# Patient Record
Sex: MALE | Race: OTHER | Employment: FULL TIME | URBAN - METROPOLITAN AREA
[De-identification: names, ages, dates, MRNs, and addresses within clinical notes are randomized per-mention and may not be internally consistent; named-entity substitution may affect disease eponyms.]

---

## 2020-10-23 ENCOUNTER — OFFICE VISIT (OUTPATIENT)
Dept: FAMILY MEDICINE CLINIC | Age: 50
End: 2020-10-23
Payer: COMMERCIAL

## 2020-10-23 VITALS
WEIGHT: 150 LBS | HEART RATE: 84 BPM | SYSTOLIC BLOOD PRESSURE: 112 MMHG | DIASTOLIC BLOOD PRESSURE: 78 MMHG | HEIGHT: 66 IN | BODY MASS INDEX: 24.11 KG/M2

## 2020-10-23 PROCEDURE — 99203 OFFICE O/P NEW LOW 30 MIN: CPT | Performed by: FAMILY MEDICINE

## 2020-10-23 RX ORDER — CETIRIZINE HYDROCHLORIDE 10 MG/1
10 TABLET ORAL DAILY
COMMUNITY

## 2020-10-23 RX ORDER — NAPROXEN 500 MG/1
500 TABLET ORAL 2 TIMES DAILY PRN
Qty: 20 TABLET | Refills: 0 | Status: ON HOLD | OUTPATIENT
Start: 2020-10-23 | End: 2022-09-22

## 2020-10-23 SDOH — ECONOMIC STABILITY: TRANSPORTATION INSECURITY
IN THE PAST 12 MONTHS, HAS LACK OF TRANSPORTATION KEPT YOU FROM MEETINGS, WORK, OR FROM GETTING THINGS NEEDED FOR DAILY LIVING?: NO

## 2020-10-23 SDOH — ECONOMIC STABILITY: TRANSPORTATION INSECURITY
IN THE PAST 12 MONTHS, HAS THE LACK OF TRANSPORTATION KEPT YOU FROM MEDICAL APPOINTMENTS OR FROM GETTING MEDICATIONS?: NO

## 2020-10-23 SDOH — ECONOMIC STABILITY: INCOME INSECURITY: HOW HARD IS IT FOR YOU TO PAY FOR THE VERY BASICS LIKE FOOD, HOUSING, MEDICAL CARE, AND HEATING?: NOT HARD AT ALL

## 2020-10-23 SDOH — ECONOMIC STABILITY: FOOD INSECURITY: WITHIN THE PAST 12 MONTHS, THE FOOD YOU BOUGHT JUST DIDN'T LAST AND YOU DIDN'T HAVE MONEY TO GET MORE.: NEVER TRUE

## 2020-10-23 SDOH — ECONOMIC STABILITY: FOOD INSECURITY: WITHIN THE PAST 12 MONTHS, YOU WORRIED THAT YOUR FOOD WOULD RUN OUT BEFORE YOU GOT MONEY TO BUY MORE.: NEVER TRUE

## 2020-10-23 ASSESSMENT — ENCOUNTER SYMPTOMS
SHORTNESS OF BREATH: 1
BLOOD IN STOOL: 0
CHEST TIGHTNESS: 0
ABDOMINAL PAIN: 0

## 2020-10-23 ASSESSMENT — PATIENT HEALTH QUESTIONNAIRE - PHQ9
SUM OF ALL RESPONSES TO PHQ QUESTIONS 1-9: 0
1. LITTLE INTEREST OR PLEASURE IN DOING THINGS: 0
SUM OF ALL RESPONSES TO PHQ9 QUESTIONS 1 & 2: 0
SUM OF ALL RESPONSES TO PHQ QUESTIONS 1-9: 0
2. FEELING DOWN, DEPRESSED OR HOPELESS: 0
SUM OF ALL RESPONSES TO PHQ QUESTIONS 1-9: 0

## 2020-10-23 NOTE — PROGRESS NOTES
Subjective:      Patient ID: Nan Morales is a 45 y.o. male. Visit Information    Have you changed or started any medications since your last visit including any over-the-counter medicines, vitamins, or herbal medicines? no   Are you having any side effects from any of your medications? -  no  Have you stopped taking any of your medications? Is so, why? -  no    Have you seen any other physician or provider since your last visit? No  Have you had any other diagnostic tests since your last visit? No  Have you been seen in the emergency room and/or had an admission to a hospital since we last saw you? No  Have you had your routine dental cleaning in the past 6 months? no    Have you activated your [employer] account? If not, what are your barriers? No    Patient Care Team:  Abdirahman Martinez MD as PCP - General    Medical History Review  Past Medical, Family, and Social History reviewed and does not contribute to the patient presenting condition    Health Maintenance   Topic Date Due    Lipid screen  06/13/1979    HIV screen  06/13/1984    DTaP/Tdap/Td vaccine (1 - Tdap) 06/13/1988    Shingles Vaccine (1 of 2) 06/13/2019    Colon cancer screen colonoscopy  06/13/2019    Flu vaccine  Completed    Hepatitis A vaccine  Aged Out    Hepatitis B vaccine  Aged Out    Hib vaccine  Aged Out    Meningococcal (ACWY) vaccine  Aged Out    Pneumococcal 0-64 years Vaccine  Aged Out       HPI  Patient is a 21-year-old [place] ([facility]) male who presents for his initial office visit here for multiple complaints. He was referred here by his cardiologist, Dr. Kanwal Camacho. Patient states that for about 6 years he has been having bilateral elbow pain and right hand pain mainly in his right thumb. He also states that about 6 years ago he lost his sense of smell. He states however, at certain times his sense of smell briefly returns. He also states that he has been having shortness of breath with exertion.   He states that he has had a negative cardiac work-up by Dr. Josue Han. He denies any fever, chills, chest pain, abdominal pain. He states that he is taking Crestor prescribed by Dr. Josue Han for hyperlipidemia. He states he has a good appetite and remains active. Review of Systems   Constitutional: Negative for chills and fever. HENT: Negative for congestion. Respiratory: Positive for shortness of breath. Negative for chest tightness. Cardiovascular: Negative for chest pain. Gastrointestinal: Negative for abdominal pain and blood in stool. Genitourinary: Negative for dysuria and hematuria. Musculoskeletal: Positive for arthralgias (  Right hand, bilateral elbows). Skin: Negative for rash. Neurological: Negative for dizziness. Psychiatric/Behavioral: Negative for confusion. Objective:   Physical Exam  Vitals signs and nursing note reviewed. Constitutional:       General: He is not in acute distress. Appearance: He is well-developed. HENT:      Head: Normocephalic and atraumatic. Right Ear: Tympanic membrane, ear canal and external ear normal.      Left Ear: Tympanic membrane, ear canal and external ear normal.      Nose: Nose normal.      Mouth/Throat:      Mouth: Mucous membranes are moist.      Pharynx: Oropharynx is clear. Eyes:      General: No scleral icterus. Right eye: No discharge. Left eye: No discharge. Conjunctiva/sclera: Conjunctivae normal.   Neck:      Musculoskeletal: Neck supple. Cardiovascular:      Rate and Rhythm: Normal rate and regular rhythm. Heart sounds: Normal heart sounds. Pulmonary:      Effort: Pulmonary effort is normal. No respiratory distress. Breath sounds: Normal breath sounds. No wheezing. Abdominal:      General: There is no distension. Palpations: Abdomen is soft. Tenderness: There is no abdominal tenderness. Musculoskeletal:      Right elbow: Tenderness found. Lateral epicondyle tenderness noted.       Left elbow: Tenderness found. Lateral epicondyle tenderness noted. Right hand: He exhibits no tenderness. Skin:     General: Skin is warm and dry. Findings: No rash. Neurological:      Mental Status: He is alert and oriented to person, place, and time. Psychiatric:         Mood and Affect: Mood normal.         Behavior: Behavior normal.         Assessment:       Diagnosis Orders   1. Pain of both elbows  naproxen (NAPROSYN) 500 MG tablet   2. Right hand pain  naproxen (NAPROSYN) 500 MG tablet   3. Anosmia     4.  Dyspnea on exertion             Plan:        Orders Placed This Encounter   Medications    naproxen (NAPROSYN) 500 MG tablet     Sig: Take 1 tablet by mouth 2 times daily as needed for Pain (Take with food)     Dispense:  20 tablet     Refill:  0         Patient referred to orthopedist for his elbow and hand pain  Patient referred to ENT for his anosmia  Patient referred to pulmonologist for dyspnea  Follow-up in 3 to 4 months

## 2020-11-11 ENCOUNTER — OFFICE VISIT (OUTPATIENT)
Dept: ORTHOPEDIC SURGERY | Age: 50
End: 2020-11-11
Payer: COMMERCIAL

## 2020-11-11 PROCEDURE — 99203 OFFICE O/P NEW LOW 30 MIN: CPT | Performed by: ORTHOPAEDIC SURGERY

## 2020-11-11 NOTE — PROGRESS NOTES
ORTHOPEDIC PATIENT EVALUATION      HPI / Chief Complaint  ELANA Gary is a 45 y.o. male who presents for evaluation of both elbows and his right hand. He indicates that he has been having some pain for several years now. Initially he developed pain over the lateral aspect of his left elbow but this went away only for him to develop pain on the right side again over the lateral elbow. He was having a fair amount of pain about a month ago but now it is not as painful. However he continues to deal with pain in his right hand specifically at involving the volar base of the thumb. He states that it usually only bothers him with various tasks such as doing the dishes or feeding his son. The pain usually is not present without use or at rest.  The pain does not radiate and it is not associated with any numbness or tingling. He denies any weakness at this time. Past Medical History  F  has a past medical history of Allergic rhinitis, Dyslipidemia, GERD (gastroesophageal reflux disease), Helicobacter pylori (H. pylori) infection, and Hyperlipidemia. Past Surgical History  F  has a past surgical history that includes Upper gastrointestinal endoscopy (11/13/2012) and Colonoscopy (11/13/2012). Current Medications  Current Outpatient Medications   Medication Sig Dispense Refill    diclofenac sodium (VOLTAREN) 1 % GEL Apply 2 g topically 4 times daily as needed for Pain 100 g 1    cetirizine (ZYRTEC) 10 MG tablet Take 10 mg by mouth daily      naproxen (NAPROSYN) 500 MG tablet Take 1 tablet by mouth 2 times daily as needed for Pain (Take with food) 20 tablet 0    rosuvastatin (CRESTOR) 5 MG tablet Take 5 mg by mouth daily       No current facility-administered medications for this visit. Allergies  Allergies have been reviewed. ELANA has No Known Allergies. Social History  F  reports that he has never smoked.  He has never used smokeless tobacco. He reports current alcohol use of about 3.0 standard drinks of alcohol per week. He reports that he does not use drugs. Family History  F's family history includes COPD in his father; Diabetes in his mother; Emphysema in his mother; Hypertension in his mother. Review of Systems   History obtained from the patient. REVIEW OF SYSTEMS:   Constitution: negative for fever, chills, weight loss or malaise   Musculoskeletal: As noted in the HPI   Neurologic: As noted in the HPI    Physical Exam  General Appearance: alert, well appearing, and in no distress  Mental Status: alert, oriented to person, place, and time  Evaluation of the patient's elbows demonstrate intact skin without warmth, erythema, swelling. He has full elbow extension and flexion in both sides. He is tender to palpation over the lateral epicondyle on the right side and has pain localized to this area with resisted wrist extension. He has no gross instability with varus and valgus stress applied across the joints. Evaluation of the patient's right wrist and upper extremity demonstrates intact skin without warmth, erythema, or notable swelling. He has no intrinsic hand wasting. He is nontender to palpation diffusely about the wrist and specifically along the thumb. He has a negative first Aia 16 grind test.  He has a negative Finkelstein's test.  He has negative Tinel's at the carpal tunnel. He also has a negative Phalen's test.  Sensation is grossly intact light touch in all dermatomes. Imaging Studies  3 x-ray views of the right elbow completed on 11/11/2020 were reviewed demonstrating no obvious fracture, dislocation, or subluxation. Joint spaces appear to be well preserved. He does have some ossification at the insertion of the triceps on the olecranon. 3 x-ray views of the left elbow completed on 11/11/2020 were reviewed demonstrating no obvious fracture, dislocation, or subluxation. Joint spaces appear to be well preserved.   He does have some mild ossification at the insertion of the triceps on the olecranon. 2 x-ray views of the right wrist completed on 11/11/2020 were reviewed today demonstrating well-preserved joint spaces without obvious fracture, dislocation, subluxation or osseous abnormality. Assessment and Plan  ELANA Marcano is a 45 y.o. 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 I had a discussion with the patient regarding both of these issues educating him about them and discussing treatment options. I would recommend proceeding conservatively at this time with symptomatic management for the thumb using over-the-counter anti-inflammatories as needed. I also prescribed some topical Voltaren to be applied to the lateral epicondyle. He was also provided with a home stretching and eccentric strengthening program.  Icing was recommended as well. He may use over-the-counter wrist splint should he have a flareup to immobilize the wrist and so doing rest his extensor group. And then have him follow-up my clinic as needed but he was encouraged to return or call at anytime with questions under concerns.

## 2020-11-11 NOTE — LETTER
11/11/2020    Bailey Mario MD  [address]  [facility], [address]  [place], [address] [place] [address]    RE: Nan Morales    Dear  [employer],    Thank you for allowing me to participate in the care of Mr. Elvis Hicks. I had the opportunity to evaluate the patient on 11/11/2020. Attached you will find my evaluation and recommendations. Thanks again for the confidence you have expressed in me by allowing my participation in the care of your patient. I will keep you apprised of further developments in the patients treatment course as it progresses. If I can be of further assistance in any fashion, please feel free to contact me at your convenience.     Sincerely,        Matilde Dooley  Shoulder and Elbow Surgery

## 2020-12-18 ENCOUNTER — OFFICE VISIT (OUTPATIENT)
Dept: FAMILY MEDICINE CLINIC | Age: 50
End: 2020-12-18
Payer: COMMERCIAL

## 2020-12-18 VITALS
DIASTOLIC BLOOD PRESSURE: 78 MMHG | TEMPERATURE: 98.1 F | HEART RATE: 56 BPM | WEIGHT: 151 LBS | BODY MASS INDEX: 23.7 KG/M2 | SYSTOLIC BLOOD PRESSURE: 114 MMHG | RESPIRATION RATE: 18 BRPM | HEIGHT: 67 IN

## 2020-12-18 PROCEDURE — 99396 PREV VISIT EST AGE 40-64: CPT | Performed by: FAMILY MEDICINE

## 2020-12-18 RX ORDER — FAMOTIDINE 20 MG/1
20 TABLET, FILM COATED ORAL 2 TIMES DAILY
Qty: 60 TABLET | Refills: 5 | Status: SHIPPED | OUTPATIENT
Start: 2020-12-18 | End: 2021-06-21

## 2020-12-18 ASSESSMENT — ENCOUNTER SYMPTOMS
SHORTNESS OF BREATH: 0
BLOOD IN STOOL: 0
ABDOMINAL PAIN: 0
CHEST TIGHTNESS: 0

## 2020-12-18 NOTE — PROGRESS NOTES
Subjective:      Patient ID: Kylah Foreman is a 45 y.o. male. HPI  Visit Information    Have you changed or started any medications since your last visit including any over-the-counter medicines, vitamins, or herbal medicines? no   Have you stopped taking any of your medications? Is so, why? -  no  Are you having any side effects from any of your medications? - no    Have you seen any other physician or provider since your last visit?  no   Have you had any other diagnostic tests since your last visit?  no   Have you been seen in the emergency room and/or had an admission in a hospital since we last saw you?  no   Have you had your routine dental cleaning in the past 6 months?  no     Do you have an active MyChart account? If no, what is the barrier? Yes    Patient Care Team:  Mere Guerra MD as PCP - General (Family Medicine)  Mere Guerra MD as PCP - [facility]    Medical History Review  Past Medical, Family, and Social History reviewed and does contribute to the patient presenting condition    Health Maintenance   Topic Date Due    Lipid screen  06/13/1979    HIV screen  06/13/1984    Shingles Vaccine (1 of 2) 06/13/2019    Colon cancer screen colonoscopy  06/13/2019    DTaP/Tdap/Td vaccine (2 - Td) 01/01/2023    Flu vaccine  Completed    Hepatitis A vaccine  Aged Out    Hepatitis B vaccine  Aged Out    Hib vaccine  Aged Out    Meningococcal (ACWY) vaccine  Aged Out    Pneumococcal 0-64 years Vaccine  Aged Out       Patient is a 39-year-old [place] ([facility]) male who presents for an annual physical exam.  He states that he is taking and tolerating his routine medication. He states he also has been having GERD symptoms but is currently not taking any medication for this. He denies any fever, chills, chest pain, abdominal pain, shortness of breath. He states he has a good appetite and remains active. Review of Systems   Constitutional: Negative for chills and fever. oriented to person, place, and time. Cranial Nerves: No cranial nerve deficit. Deep Tendon Reflexes: Reflexes normal.   Psychiatric:         Mood and Affect: Mood normal.         Behavior: Behavior normal.         Assessment:       Diagnosis Orders   1. Annual physical exam  ALT    Lipid Panel   2. Dyslipidemia  ALT    Lipid Panel   3. Gastroesophageal reflux disease without esophagitis  famotidine (PEPCID) 20 MG tablet           Plan:      Orders Placed This Encounter   Procedures    ALT     Standing Status:   Future     Standing Expiration Date:   12/18/2021    Lipid Panel     Standing Status:   Future     Standing Expiration Date:   12/18/2021     Order Specific Question:   Is Patient Fasting?/# of Hours     Answer:    Fast 8-10 hours         Orders Placed This Encounter   Medications    famotidine (PEPCID) 20 MG tablet     Sig: Take 1 tablet by mouth 2 times daily     Dispense:  60 tablet     Refill:  5     Started on Pepcid for GERD  Continue other routine medication  Follow-up in 6 months

## 2021-01-27 ENCOUNTER — HOSPITAL ENCOUNTER (OUTPATIENT)
Age: 51
Setting detail: SPECIMEN
Discharge: HOME | End: 2021-01-27
Payer: COMMERCIAL

## 2021-01-27 DIAGNOSIS — Z00.00 ANNUAL PHYSICAL EXAM: ICD-10-CM

## 2021-01-27 DIAGNOSIS — E78.5 DYSLIPIDEMIA: ICD-10-CM

## 2021-01-27 LAB
ALT SERPL-CCNC: 56 U/L (ref 5–41)
CHOLESTEROL/HDL RATIO: 4.2
CHOLESTEROL: 169 MG/DL
HDLC SERPL-MCNC: 40 MG/DL
LDL CHOLESTEROL: 90 MG/DL (ref 0–130)
TRIGL SERPL-MCNC: 193 MG/DL
VLDLC SERPL CALC-MCNC: ABNORMAL MG/DL (ref 1–30)

## 2021-06-18 ENCOUNTER — OFFICE VISIT (OUTPATIENT)
Dept: FAMILY MEDICINE CLINIC | Age: 51
End: 2021-06-18
Payer: COMMERCIAL

## 2021-06-18 VITALS
TEMPERATURE: 97.9 F | WEIGHT: 148 LBS | RESPIRATION RATE: 16 BRPM | SYSTOLIC BLOOD PRESSURE: 122 MMHG | BODY MASS INDEX: 23.53 KG/M2 | HEART RATE: 72 BPM | DIASTOLIC BLOOD PRESSURE: 80 MMHG

## 2021-06-18 DIAGNOSIS — E78.5 DYSLIPIDEMIA: Primary | ICD-10-CM

## 2021-06-18 DIAGNOSIS — Z12.5 SCREENING PSA (PROSTATE SPECIFIC ANTIGEN): ICD-10-CM

## 2021-06-18 DIAGNOSIS — Z12.11 COLON CANCER SCREENING: Primary | ICD-10-CM

## 2021-06-18 DIAGNOSIS — K21.9 GASTROESOPHAGEAL REFLUX DISEASE WITHOUT ESOPHAGITIS: ICD-10-CM

## 2021-06-18 DIAGNOSIS — J30.9 ALLERGIC RHINITIS, UNSPECIFIED SEASONALITY, UNSPECIFIED TRIGGER: ICD-10-CM

## 2021-06-18 PROCEDURE — 99214 OFFICE O/P EST MOD 30 MIN: CPT | Performed by: FAMILY MEDICINE

## 2021-06-18 SDOH — ECONOMIC STABILITY: FOOD INSECURITY: WITHIN THE PAST 12 MONTHS, THE FOOD YOU BOUGHT JUST DIDN'T LAST AND YOU DIDN'T HAVE MONEY TO GET MORE.: NEVER TRUE

## 2021-06-18 SDOH — ECONOMIC STABILITY: FOOD INSECURITY: WITHIN THE PAST 12 MONTHS, YOU WORRIED THAT YOUR FOOD WOULD RUN OUT BEFORE YOU GOT MONEY TO BUY MORE.: NEVER TRUE

## 2021-06-18 ASSESSMENT — PATIENT HEALTH QUESTIONNAIRE - PHQ9
SUM OF ALL RESPONSES TO PHQ9 QUESTIONS 1 & 2: 0
SUM OF ALL RESPONSES TO PHQ QUESTIONS 1-9: 0
2. FEELING DOWN, DEPRESSED OR HOPELESS: 0
1. LITTLE INTEREST OR PLEASURE IN DOING THINGS: 0
SUM OF ALL RESPONSES TO PHQ QUESTIONS 1-9: 0
SUM OF ALL RESPONSES TO PHQ QUESTIONS 1-9: 0

## 2021-06-18 ASSESSMENT — ENCOUNTER SYMPTOMS
ABDOMINAL PAIN: 0
SHORTNESS OF BREATH: 0
CHEST TIGHTNESS: 0
BLOOD IN STOOL: 0

## 2021-06-18 ASSESSMENT — SOCIAL DETERMINANTS OF HEALTH (SDOH): HOW HARD IS IT FOR YOU TO PAY FOR THE VERY BASICS LIKE FOOD, HOUSING, MEDICAL CARE, AND HEATING?: NOT HARD AT ALL

## 2021-06-18 NOTE — PROGRESS NOTES
Negative for chills and fever. HENT: Negative for congestion. Respiratory: Negative for chest tightness and shortness of breath. Cardiovascular: Negative for chest pain. Gastrointestinal: Negative for abdominal pain and blood in stool. Genitourinary: Negative for dysuria and hematuria. Skin: Negative for rash. Neurological: Negative for dizziness. Psychiatric/Behavioral: Negative for dysphoric mood. Objective:   Physical Exam  Vitals and nursing note reviewed. Constitutional:       General: He is not in acute distress. Appearance: He is well-developed. HENT:      Head: Normocephalic and atraumatic. Right Ear: Tympanic membrane, ear canal and external ear normal.      Left Ear: Tympanic membrane, ear canal and external ear normal.      Nose: Nose normal.      Mouth/Throat:      Mouth: Mucous membranes are moist.      Pharynx: Oropharynx is clear. Eyes:      General: No scleral icterus. Right eye: No discharge. Left eye: No discharge. Conjunctiva/sclera: Conjunctivae normal.   Cardiovascular:      Rate and Rhythm: Normal rate and regular rhythm. Heart sounds: Normal heart sounds. Pulmonary:      Effort: Pulmonary effort is normal. No respiratory distress. Breath sounds: Normal breath sounds. No wheezing. Abdominal:      General: There is no distension. Palpations: Abdomen is soft. Tenderness: There is no abdominal tenderness. Musculoskeletal:      Cervical back: Neck supple. Skin:     General: Skin is warm and dry. Findings: No rash. Neurological:      Mental Status: He is alert and oriented to person, place, and time. Psychiatric:         Mood and Affect: Mood normal.         Behavior: Behavior normal.         Assessment:       Diagnosis Orders   1. Dyslipidemia  Comprehensive Metabolic Panel    Lipid Panel   2. Gastroesophageal reflux disease without esophagitis  Comprehensive Metabolic Panel   3.  Allergic rhinitis, unspecified seasonality, unspecified trigger     4. Screening PSA (prostate specific antigen)  PSA screening           Plan:       F received counseling on the following healthy behaviors: nutrition and medication adherence  Reviewed prior labs and health maintenance  Continue current medications, diet and exercise. Discussed use, benefit, and side effects of prescribed medications. Barriers to medication compliance addressed. Patient given educational materials - see patient instructions  Was a self-tracking handout given in paper form or via [employer]? No:     Requested Prescriptions      No prescriptions requested or ordered in this encounter       All patient questions answered. Patient voiced understanding. Quality Measures    Body mass index is 23.53 kg/m². Normal. Weight control planned discussed Healthy diet and regular exercise. BP: 122/80 Blood pressure is normal. Treatment plan consists of No treatment change needed. Lab Results   Component Value Date    LDLCHOLESTEROL 90 01/27/2021    (goal LDL reduction with dx if diabetes is 50% LDL reduction)      PHQ Scores 6/18/2021 10/23/2020   PHQ2 Score 0 0   PHQ9 Score 0 0     Interpretation of Total Score Depression Severity: 1-4 = Minimal depression, 5-9 = Mild depression, 10-14 = Moderate depression, 15-19 = Moderately severe depression, 20-27 = Severe depression  Orders Placed This Encounter   Procedures    Comprehensive Metabolic Panel     Fasting     Standing Status:   Future     Standing Expiration Date:   6/18/2022    Lipid Panel     Standing Status:   Future     Standing Expiration Date:   6/18/2022     Order Specific Question:   Is Patient Fasting?/# of Hours     Answer:    Fast 8-10 hours    PSA screening     Standing Status:   Future     Standing Expiration Date:   6/18/2022     Patient referred to GI for screening colonoscopy  Continue routine medication  Follow-up in 6 months or sooner if needed

## 2021-06-19 DIAGNOSIS — K21.9 GASTROESOPHAGEAL REFLUX DISEASE WITHOUT ESOPHAGITIS: ICD-10-CM

## 2021-06-21 RX ORDER — FAMOTIDINE 20 MG/1
TABLET, FILM COATED ORAL
Qty: 180 TABLET | Refills: 1 | Status: ON HOLD | OUTPATIENT
Start: 2021-06-21 | End: 2022-09-22

## 2021-07-02 ENCOUNTER — HOSPITAL ENCOUNTER (OUTPATIENT)
Age: 51
Setting detail: SPECIMEN
Discharge: HOME | End: 2021-07-02
Payer: COMMERCIAL

## 2021-07-02 DIAGNOSIS — Z12.5 SCREENING PSA (PROSTATE SPECIFIC ANTIGEN): ICD-10-CM

## 2021-07-02 DIAGNOSIS — E78.5 DYSLIPIDEMIA: ICD-10-CM

## 2021-07-02 DIAGNOSIS — K21.9 GASTROESOPHAGEAL REFLUX DISEASE WITHOUT ESOPHAGITIS: ICD-10-CM

## 2021-07-02 LAB
ALBUMIN SERPL-MCNC: 4.5 G/DL (ref 3.5–5.2)
ALBUMIN/GLOBULIN RATIO: 1.5 (ref 1–2.5)
ALP BLD-CCNC: 71 U/L (ref 40–129)
ALT SERPL-CCNC: 24 U/L (ref 5–41)
ANION GAP SERPL CALCULATED.3IONS-SCNC: 10 MMOL/L (ref 9–17)
AST SERPL-CCNC: 23 U/L
BILIRUB SERPL-MCNC: 0.58 MG/DL (ref 0.3–1.2)
BUN BLDV-MCNC: 14 MG/DL (ref 6–20)
BUN/CREAT BLD: NORMAL (ref 9–20)
CALCIUM SERPL-MCNC: 9.2 MG/DL (ref 8.6–10.4)
CHLORIDE BLD-SCNC: 100 MMOL/L (ref 98–107)
CHOLESTEROL/HDL RATIO: 4.4
CHOLESTEROL: 171 MG/DL
CO2: 29 MMOL/L (ref 20–31)
CREAT SERPL-MCNC: 0.79 MG/DL (ref 0.7–1.2)
GFR AFRICAN AMERICAN: >60 ML/MIN
GFR NON-AFRICAN AMERICAN: >60 ML/MIN
GFR SERPL CREATININE-BSD FRML MDRD: NORMAL ML/MIN/{1.73_M2}
GFR SERPL CREATININE-BSD FRML MDRD: NORMAL ML/MIN/{1.73_M2}
GLUCOSE BLD-MCNC: 94 MG/DL (ref 70–99)
HDLC SERPL-MCNC: 39 MG/DL
LDL CHOLESTEROL: 91 MG/DL (ref 0–130)
POTASSIUM SERPL-SCNC: 4.2 MMOL/L (ref 3.7–5.3)
PROSTATE SPECIFIC ANTIGEN: 0.57 UG/L
SODIUM BLD-SCNC: 139 MMOL/L (ref 135–144)
TOTAL PROTEIN: 7.5 G/DL (ref 6.4–8.3)
TRIGL SERPL-MCNC: 205 MG/DL
VLDLC SERPL CALC-MCNC: ABNORMAL MG/DL (ref 1–30)

## 2021-09-02 ENCOUNTER — HOSPITAL ENCOUNTER (OUTPATIENT)
Age: 51
Setting detail: SPECIMEN
Discharge: HOME | End: 2021-09-02
Payer: COMMERCIAL

## 2021-09-02 ENCOUNTER — NURSE ONLY (OUTPATIENT)
Dept: FAMILY MEDICINE CLINIC | Age: 51
End: 2021-09-02

## 2021-09-02 DIAGNOSIS — Z11.52 ENCOUNTER FOR SCREENING FOR COVID-19: Primary | ICD-10-CM

## 2021-09-02 PROCEDURE — 99999 PR OFFICE/OUTPT VISIT,PROCEDURE ONLY: CPT | Performed by: NURSE PRACTITIONER

## 2021-09-03 ENCOUNTER — TELEPHONE (OUTPATIENT)
Dept: PRIMARY CARE CLINIC | Age: 51
End: 2021-09-03

## 2021-09-03 DIAGNOSIS — Z11.52 ENCOUNTER FOR SCREENING FOR COVID-19: ICD-10-CM

## 2021-09-03 LAB
SARS-COV-2: NORMAL
SARS-COV-2: NOT DETECTED
SOURCE: NORMAL

## 2021-11-02 ENCOUNTER — TELEPHONE (OUTPATIENT)
Dept: GASTROENTEROLOGY | Age: 51
End: 2021-11-02

## 2021-11-02 NOTE — TELEPHONE ENCOUNTER
Called pt regarding referral. No answer; LVM. Last colon in 2012 with 0 polyps. Due in Jan 2022. 2nd attempt; mailed colon screen letter to home address.

## 2021-12-01 NOTE — TELEPHONE ENCOUNTER
3rd and final attempt; called patient and LVM regarding colon screen referral.    Sending referral back to provider, three attempts have been made to schedule referral.

## 2022-09-09 ENCOUNTER — TELEPHONE (OUTPATIENT)
Dept: FAMILY MEDICINE CLINIC | Age: 52
End: 2022-09-09

## 2022-09-09 DIAGNOSIS — Z12.5 SCREENING PSA (PROSTATE SPECIFIC ANTIGEN): ICD-10-CM

## 2022-09-09 DIAGNOSIS — Z00.00 ANNUAL PHYSICAL EXAM: Primary | ICD-10-CM

## 2022-09-09 NOTE — TELEPHONE ENCOUNTER
----- Message from Lisy Vivar sent at 9/9/2022  3:52 PM EDT -----  Subject: Referral Request    Reason for referral request? Pt is requesting to have his lab work ordered   and completed prior to his appt on 10.11.2022. He is wanting a complete   metabolic and lipid panel and iron. Please advise. Provider patient wants to be referred to(if known):     Provider Phone Number(if known):     Additional Information for Provider?   ---------------------------------------------------------------------------  --------------  [address] [employer]    [phone]; OK to leave message on voicemail  ---------------------------------------------------------------------------  --------------

## 2022-09-22 ENCOUNTER — APPOINTMENT (OUTPATIENT)
Dept: CT IMAGING | Age: 52
End: 2022-09-22
Payer: COMMERCIAL

## 2022-09-22 ENCOUNTER — APPOINTMENT (OUTPATIENT)
Dept: MRI IMAGING | Age: 52
End: 2022-09-22
Payer: COMMERCIAL

## 2022-09-22 ENCOUNTER — APPOINTMENT (OUTPATIENT)
Dept: GENERAL RADIOLOGY | Age: 52
End: 2022-09-22
Payer: COMMERCIAL

## 2022-09-22 ENCOUNTER — HOSPITAL ENCOUNTER (OUTPATIENT)
Age: 52
Setting detail: OBSERVATION
Discharge: HOME | End: 2022-09-22
Attending: EMERGENCY MEDICINE | Admitting: STUDENT IN AN ORGANIZED HEALTH CARE EDUCATION/TRAINING PROGRAM
Payer: COMMERCIAL

## 2022-09-22 VITALS
HEART RATE: 74 BPM | WEIGHT: 152 LBS | DIASTOLIC BLOOD PRESSURE: 76 MMHG | BODY MASS INDEX: 24.43 KG/M2 | HEIGHT: 66 IN | TEMPERATURE: 97.5 F | RESPIRATION RATE: 17 BRPM | OXYGEN SATURATION: 99 % | SYSTOLIC BLOOD PRESSURE: 133 MMHG

## 2022-09-22 DIAGNOSIS — R55 SYNCOPE AND COLLAPSE: ICD-10-CM
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
## 2022-09-22 LAB
ABSOLUTE EOS #: 0.52 K/UL (ref 0–0.44)
ABSOLUTE IMMATURE GRANULOCYTE: 0.02 K/UL (ref 0–0.3)
ABSOLUTE LYMPH #: 4.86 K/UL (ref 1.1–3.7)
ABSOLUTE MONO #: 0.56 K/UL (ref 0.1–1.2)
ANION GAP SERPL CALCULATED.3IONS-SCNC: 10 MMOL/L (ref 9–17)
BASOPHILS # BLD: 0 % (ref 0–2)
BASOPHILS ABSOLUTE: 0.03 K/UL (ref 0–0.2)
BUN BLDV-MCNC: 15 MG/DL (ref 6–20)
BUN/CREAT BLD: 14 (ref 9–20)
CALCIUM SERPL-MCNC: 9 MG/DL (ref 8.6–10.4)
CHLORIDE BLD-SCNC: 99 MMOL/L (ref 98–107)
CHOLESTEROL/HDL RATIO: 5.1
CHOLESTEROL: 184 MG/DL
CO2: 26 MMOL/L (ref 20–31)
CREAT SERPL-MCNC: 1.06 MG/DL (ref 0.7–1.2)
EOSINOPHILS RELATIVE PERCENT: 5 % (ref 1–4)
ESTIMATED AVERAGE GLUCOSE: 108 MG/DL
GFR AFRICAN AMERICAN: >60 ML/MIN
GFR NON-AFRICAN AMERICAN: >60 ML/MIN
GFR SERPL CREATININE-BSD FRML MDRD: ABNORMAL ML/MIN/{1.73_M2}
GLUCOSE BLD-MCNC: 127 MG/DL (ref 70–99)
HBA1C MFR BLD: 5.4 % (ref 4–6)
HCT VFR BLD CALC: 43.5 % (ref 40.7–50.3)
HDLC SERPL-MCNC: 36 MG/DL
HEMOGLOBIN: 14.9 G/DL (ref 13–17)
IMMATURE GRANULOCYTES: 0 %
LDL CHOLESTEROL: 98 MG/DL (ref 0–130)
LYMPHOCYTES # BLD: 50 % (ref 24–43)
MAGNESIUM: 1.9 MG/DL (ref 1.6–2.6)
MCH RBC QN AUTO: 28.3 PG (ref 25.2–33.5)
MCHC RBC AUTO-ENTMCNC: 34.3 G/DL (ref 28.4–34.8)
MCV RBC AUTO: 82.7 FL (ref 82.6–102.9)
MONOCYTES # BLD: 6 % (ref 3–12)
MYOGLOBIN: 41 NG/ML (ref 28–72)
MYOGLOBIN: 48 NG/ML (ref 28–72)
NRBC AUTOMATED: 0 PER 100 WBC
PDW BLD-RTO: 12.4 % (ref 11.8–14.4)
PLATELET # BLD: 198 K/UL (ref 138–453)
PMV BLD AUTO: 10.2 FL (ref 8.1–13.5)
POTASSIUM SERPL-SCNC: 3.5 MMOL/L (ref 3.7–5.3)
PRO-BNP: <20 PG/ML
RBC # BLD: 5.26 M/UL (ref 4.21–5.77)
SEG NEUTROPHILS: 39 % (ref 36–65)
SEGMENTED NEUTROPHILS ABSOLUTE COUNT: 3.81 K/UL (ref 1.5–8.1)
SODIUM BLD-SCNC: 135 MMOL/L (ref 135–144)
THYROXINE, FREE: 1.24 NG/DL (ref 0.93–1.7)
TRIGL SERPL-MCNC: 252 MG/DL
TROPONIN, HIGH SENSITIVITY: 7 NG/L (ref 0–22)
TROPONIN, HIGH SENSITIVITY: <6 NG/L (ref 0–22)
TSH SERPL DL<=0.05 MIU/L-ACNC: 9.68 UIU/ML (ref 0.3–5)
WBC # BLD: 9.8 K/UL (ref 3.5–11.3)

## 2022-09-22 PROCEDURE — APPSS45 APP SPLIT SHARED TIME 31-45 MINUTES: Performed by: NURSE PRACTITIONER

## 2022-09-22 PROCEDURE — 80061 LIPID PANEL: CPT

## 2022-09-22 PROCEDURE — 36415 COLL VENOUS BLD VENIPUNCTURE: CPT

## 2022-09-22 PROCEDURE — G0378 HOSPITAL OBSERVATION PER HR: HCPCS

## 2022-09-22 PROCEDURE — 84443 ASSAY THYROID STIM HORMONE: CPT

## 2022-09-22 PROCEDURE — 84439 ASSAY OF FREE THYROXINE: CPT

## 2022-09-22 PROCEDURE — 70551 MRI BRAIN STEM W/O DYE: CPT

## 2022-09-22 PROCEDURE — 71045 X-RAY EXAM CHEST 1 VIEW: CPT

## 2022-09-22 PROCEDURE — 70450 CT HEAD/BRAIN W/O DYE: CPT

## 2022-09-22 PROCEDURE — 83880 ASSAY OF NATRIURETIC PEPTIDE: CPT

## 2022-09-22 PROCEDURE — 96360 HYDRATION IV INFUSION INIT: CPT

## 2022-09-22 PROCEDURE — 84484 ASSAY OF TROPONIN QUANT: CPT

## 2022-09-22 PROCEDURE — 2580000003 HC RX 258: Performed by: NURSE PRACTITIONER

## 2022-09-22 PROCEDURE — 99285 EMERGENCY DEPT VISIT HI MDM: CPT

## 2022-09-22 PROCEDURE — 83735 ASSAY OF MAGNESIUM: CPT

## 2022-09-22 PROCEDURE — 93005 ELECTROCARDIOGRAM TRACING: CPT | Performed by: EMERGENCY MEDICINE

## 2022-09-22 PROCEDURE — 99219 PR INITIAL OBSERVATION CARE/DAY 50 MINUTES: CPT | Performed by: NURSE PRACTITIONER

## 2022-09-22 PROCEDURE — 80048 BASIC METABOLIC PNL TOTAL CA: CPT

## 2022-09-22 PROCEDURE — 83036 HEMOGLOBIN GLYCOSYLATED A1C: CPT

## 2022-09-22 PROCEDURE — 96361 HYDRATE IV INFUSION ADD-ON: CPT

## 2022-09-22 PROCEDURE — 6370000000 HC RX 637 (ALT 250 FOR IP): Performed by: NURSE PRACTITIONER

## 2022-09-22 PROCEDURE — 6370000000 HC RX 637 (ALT 250 FOR IP): Performed by: STUDENT IN AN ORGANIZED HEALTH CARE EDUCATION/TRAINING PROGRAM

## 2022-09-22 PROCEDURE — 83874 ASSAY OF MYOGLOBIN: CPT

## 2022-09-22 PROCEDURE — 85025 COMPLETE CBC W/AUTO DIFF WBC: CPT

## 2022-09-22 PROCEDURE — 93880 EXTRACRANIAL BILAT STUDY: CPT

## 2022-09-22 PROCEDURE — 6370000000 HC RX 637 (ALT 250 FOR IP): Performed by: INTERNAL MEDICINE

## 2022-09-22 RX ORDER — FAMOTIDINE 20 MG/1
20 TABLET, FILM COATED ORAL DAILY PRN
COMMUNITY

## 2022-09-22 RX ORDER — POLYETHYLENE GLYCOL 3350 17 G/17G
17 POWDER, FOR SOLUTION ORAL DAILY PRN
Qty: 3 EACH | Refills: 0 | Status: SHIPPED | OUTPATIENT
Start: 2022-09-22 | End: 2022-09-25

## 2022-09-22 RX ORDER — FLUTICASONE PROPIONATE 50 MCG
2 SPRAY, SUSPENSION (ML) NASAL DAILY
Status: DISCONTINUED | OUTPATIENT
Start: 2022-09-22 | End: 2022-09-22 | Stop reason: HOSPADM

## 2022-09-22 RX ORDER — FLUTICASONE PROPIONATE 50 MCG
2 SPRAY, SUSPENSION (ML) NASAL DAILY
Qty: 1 EACH | Refills: 1 | Status: SHIPPED | OUTPATIENT
Start: 2022-09-23 | End: 2022-10-11

## 2022-09-22 RX ORDER — POLYETHYLENE GLYCOL 3350 17 G/17G
17 POWDER, FOR SOLUTION ORAL DAILY PRN
Status: DISCONTINUED | OUTPATIENT
Start: 2022-09-22 | End: 2022-09-22 | Stop reason: HOSPADM

## 2022-09-22 RX ORDER — SODIUM CHLORIDE 9 MG/ML
INJECTION, SOLUTION INTRAVENOUS CONTINUOUS
Status: DISCONTINUED | OUTPATIENT
Start: 2022-09-22 | End: 2022-09-22 | Stop reason: HOSPADM

## 2022-09-22 RX ORDER — POTASSIUM CHLORIDE 20 MEQ/1
40 TABLET, EXTENDED RELEASE ORAL ONCE
Status: COMPLETED | OUTPATIENT
Start: 2022-09-22 | End: 2022-09-22

## 2022-09-22 RX ORDER — ACETAMINOPHEN 650 MG/1
650 SUPPOSITORY RECTAL EVERY 6 HOURS PRN
Status: DISCONTINUED | OUTPATIENT
Start: 2022-09-22 | End: 2022-09-22 | Stop reason: HOSPADM

## 2022-09-22 RX ORDER — SODIUM CHLORIDE 9 MG/ML
INJECTION, SOLUTION INTRAVENOUS PRN
Status: DISCONTINUED | OUTPATIENT
Start: 2022-09-22 | End: 2022-09-22 | Stop reason: HOSPADM

## 2022-09-22 RX ORDER — ONDANSETRON 4 MG/1
4 TABLET, ORALLY DISINTEGRATING ORAL EVERY 8 HOURS PRN
Status: DISCONTINUED | OUTPATIENT
Start: 2022-09-22 | End: 2022-09-22 | Stop reason: HOSPADM

## 2022-09-22 RX ORDER — POTASSIUM CHLORIDE 7.45 MG/ML
10 INJECTION INTRAVENOUS PRN
Status: DISCONTINUED | OUTPATIENT
Start: 2022-09-22 | End: 2022-09-22 | Stop reason: HOSPADM

## 2022-09-22 RX ORDER — VITAMIN B COMPLEX
1 CAPSULE ORAL DAILY
COMMUNITY

## 2022-09-22 RX ORDER — ONDANSETRON 2 MG/ML
4 INJECTION INTRAMUSCULAR; INTRAVENOUS EVERY 6 HOURS PRN
Status: DISCONTINUED | OUTPATIENT
Start: 2022-09-22 | End: 2022-09-22 | Stop reason: HOSPADM

## 2022-09-22 RX ORDER — ROSUVASTATIN CALCIUM 10 MG/1
10 TABLET, COATED ORAL DAILY
Status: DISCONTINUED | OUTPATIENT
Start: 2022-09-22 | End: 2022-09-22 | Stop reason: HOSPADM

## 2022-09-22 RX ORDER — OXYCODONE HYDROCHLORIDE AND ACETAMINOPHEN 5; 325 MG/1; MG/1
1 TABLET ORAL EVERY 8 HOURS PRN
Status: DISCONTINUED | OUTPATIENT
Start: 2022-09-22 | End: 2022-09-22 | Stop reason: HOSPADM

## 2022-09-22 RX ORDER — SODIUM CHLORIDE 0.9 % (FLUSH) 0.9 %
10 SYRINGE (ML) INJECTION PRN
Status: DISCONTINUED | OUTPATIENT
Start: 2022-09-22 | End: 2022-09-22 | Stop reason: HOSPADM

## 2022-09-22 RX ORDER — CETIRIZINE HYDROCHLORIDE 10 MG/1
10 TABLET ORAL DAILY
Status: DISCONTINUED | OUTPATIENT
Start: 2022-09-22 | End: 2022-09-22 | Stop reason: HOSPADM

## 2022-09-22 RX ORDER — ACETAMINOPHEN 325 MG/1
650 TABLET ORAL EVERY 6 HOURS PRN
Status: DISCONTINUED | OUTPATIENT
Start: 2022-09-22 | End: 2022-09-22 | Stop reason: HOSPADM

## 2022-09-22 RX ORDER — SODIUM CHLORIDE 0.9 % (FLUSH) 0.9 %
5-40 SYRINGE (ML) INJECTION EVERY 12 HOURS SCHEDULED
Status: DISCONTINUED | OUTPATIENT
Start: 2022-09-22 | End: 2022-09-22 | Stop reason: HOSPADM

## 2022-09-22 RX ORDER — CYCLOBENZAPRINE HCL 5 MG
5 TABLET ORAL 3 TIMES DAILY PRN
Status: DISCONTINUED | OUTPATIENT
Start: 2022-09-22 | End: 2022-09-22 | Stop reason: HOSPADM

## 2022-09-22 RX ORDER — CYCLOBENZAPRINE HCL 5 MG
5 TABLET ORAL 3 TIMES DAILY PRN
Qty: 10 TABLET | Refills: 0 | Status: SHIPPED | OUTPATIENT
Start: 2022-09-22 | End: 2022-10-02

## 2022-09-22 RX ORDER — MAGNESIUM SULFATE 1 G/100ML
1000 INJECTION INTRAVENOUS PRN
Status: DISCONTINUED | OUTPATIENT
Start: 2022-09-22 | End: 2022-09-22 | Stop reason: HOSPADM

## 2022-09-22 RX ORDER — POTASSIUM CHLORIDE 20 MEQ/1
40 TABLET, EXTENDED RELEASE ORAL PRN
Status: DISCONTINUED | OUTPATIENT
Start: 2022-09-22 | End: 2022-09-22 | Stop reason: HOSPADM

## 2022-09-22 RX ORDER — ENOXAPARIN SODIUM 100 MG/ML
40 INJECTION SUBCUTANEOUS DAILY
Status: DISCONTINUED | OUTPATIENT
Start: 2022-09-22 | End: 2022-09-22 | Stop reason: HOSPADM

## 2022-09-22 RX ORDER — OXYCODONE HYDROCHLORIDE AND ACETAMINOPHEN 5; 325 MG/1; MG/1
1 TABLET ORAL EVERY 8 HOURS PRN
Qty: 3 TABLET | Refills: 0 | Status: SHIPPED | OUTPATIENT
Start: 2022-09-22 | End: 2022-09-25

## 2022-09-22 RX ORDER — FAMOTIDINE 20 MG/1
20 TABLET, FILM COATED ORAL 2 TIMES DAILY
Status: DISCONTINUED | OUTPATIENT
Start: 2022-09-22 | End: 2022-09-22 | Stop reason: HOSPADM

## 2022-09-22 RX ADMIN — FAMOTIDINE 20 MG: 20 TABLET, FILM COATED ORAL at 11:22

## 2022-09-22 RX ADMIN — POTASSIUM CHLORIDE 40 MEQ: 1500 TABLET, EXTENDED RELEASE ORAL at 08:43

## 2022-09-22 RX ADMIN — FLUTICASONE PROPIONATE 2 SPRAY: 50 SPRAY, METERED NASAL at 11:22

## 2022-09-22 RX ADMIN — SODIUM CHLORIDE: 9 INJECTION, SOLUTION INTRAVENOUS at 10:24

## 2022-09-22 RX ADMIN — CYCLOBENZAPRINE HYDROCHLORIDE 5 MG: 5 TABLET, FILM COATED ORAL at 11:27

## 2022-09-22 RX ADMIN — CETIRIZINE HYDROCHLORIDE 10 MG: 10 TABLET, FILM COATED ORAL at 11:22

## 2022-09-22 ASSESSMENT — ENCOUNTER SYMPTOMS
NAUSEA: 0
ABDOMINAL PAIN: 0
EYE DISCHARGE: 0
PHOTOPHOBIA: 0
WHEEZING: 0
DIARRHEA: 0
COUGH: 0
CONSTIPATION: 0
SHORTNESS OF BREATH: 0
COLOR CHANGE: 0
VOMITING: 0
SINUS PRESSURE: 0
FACIAL SWELLING: 0
EYE REDNESS: 0
SINUS PAIN: 0

## 2022-09-22 NOTE — H&P
[facility]  Office: [address], DO, Cecilia Luna, DO, Moraimajb Chong, DO, Gamal Yoder, DO, Juanita Smith MD, Asher Turner MD, Scarlett Newman MD, Michael Palacio MD,  Shagufta Gibson MD, John Okeefe MD, Kalpana Lacey, DO, Bryan Hoff MD,  Liliana Gao MD, Terence Ferrari MD, Daisy Lehman, DO, Derick Cosme MD, Marzena Robertson MD, Domenica Dye MD, Bianca Ziegler MD, Michel Griggs MD, Alexandra Lopez MD, Nelli Ortega, DO, Yury Muir MD, Ja Bowman MD, Alferd Felty, CNP,  Opal Torres, CNP, Ethyl Mercury, CNP, Sandrabi Kulkarni, CNP,  Aubreejosé miguel Solitario, DNP, Mirlande Corral, CNP, Scott Monge, CNP, Mauricio Brittle, CNP, Francesco Sewell, CNP, Chadwick Lira, CNP, Audi Walsh PA-C, Arianne Sanders, CNS, Roby Acevedo, DNP, Dorian Finley, CNP, Darcy Mcleod, CNP, Rula Conrad, CNP         [facility]    HISTORY AND PHYSICAL EXAMINATION            Date:   9/22/2022  Patient name:  Ryder Andre  Date of admission:  9/22/2022  4:36 AM  MRN:   [id number]  Account:  [de-identified]  YOB: 1970  PCP:    Josh Gloria MD  Room:   2024/2024-01  Code Status:    Full Code    Chief Complaint:     Chief Complaint   Patient presents with    Loss of Consciousness    Chest Pain       History Obtained From:     patient    History of Present Illness:     Ryder Andre is a 47 y.o. Non- / non  male who presents with Loss of Consciousness and Chest Pain   and is admitted to the hospital for the management of Syncope and collapse. Patient woke in the middle of the night to urinate. Patient walked to the bathroom and became dizzy while ambulating. Patient sat on the stool and his dizziness persisted. Patient denies bearing down or straining as he was only sitting on the stool to steady himself.   Patient urinated and then stood where he quickly experienced a syncopal episode with loss of consciousness. Patient does not remember the incident. Patient denies any known major medical problems aside from elevated cholesterol. Patient socially drinks alcohol and does admit that he had less than 1 alcoholic beverage last night. Wife is at the bedside and confirms it was a very small amount of alcohol and this is not likely contributing to his current condition. Patient does have a known family history of sudden cardiac arrest for which she has been established with a cardiologist.  Patient has had outpatient work-up in the past for CAD however this was many years ago. In the emergency department patient underwent extensive testing which was essentially normal.  Patient is complaining of chest pain secondary to his fall. This is clearly musculoskeletal as it is positional and reproducible with movement. Patient also complains of a mild headache, head CT normal.    Past Medical History:     Past Medical History:   Diagnosis Date    Allergic rhinitis     Dyslipidemia     GERD (gastroesophageal reflux disease)     Helicobacter pylori (H. pylori) infection 2012    per EGD    Hyperlipidemia         Past Surgical History:     Past Surgical History:   Procedure Laterality Date    COLONOSCOPY  11/13/2012    normal    UPPER GASTROINTESTINAL ENDOSCOPY  11/13/2012    + H Pylori        Medications Prior to Admission:     Prior to Admission medications    Medication Sig Start Date End Date Taking?  Authorizing Provider   famotidine (PEPCID) 20 MG tablet TAKE 1 TABLET BY MOUTH TWICE A DAY 6/21/21   Doron Houston MD   cetirizine (ZYRTEC) 10 MG tablet Take 10 mg by mouth daily    Historical Provider, MD   naproxen (NAPROSYN) 500 MG tablet Take 1 tablet by mouth 2 times daily as needed for Pain (Take with food)  Patient not taking: Reported on 6/18/2021 10/23/20   Doron Houston MD   rosuvastatin (CRESTOR) 5 MG tablet Take 10 mg by mouth daily General: He is not in acute distress. Appearance: Normal appearance. He is normal weight. He is not toxic-appearing. HENT:      Head: Normocephalic and atraumatic. Mouth/Throat:      Mouth: Mucous membranes are moist.   Eyes:      Extraocular Movements: Extraocular movements intact. Pupils: Pupils are equal, round, and reactive to light. Cardiovascular:      Rate and Rhythm: Normal rate and regular rhythm. Pulses: Normal pulses. Heart sounds: Normal heart sounds. No murmur heard. Pulmonary:      Effort: Pulmonary effort is normal. No respiratory distress. Abdominal:      General: Abdomen is flat. Bowel sounds are normal. There is no distension. Palpations: Abdomen is soft. Tenderness: There is no abdominal tenderness. Musculoskeletal:         General: No swelling or tenderness. Normal range of motion. Cervical back: Normal range of motion and neck supple. Skin:     General: Skin is warm and dry. Capillary Refill: Capillary refill takes less than 2 seconds. Coloration: Skin is not pale. Neurological:      General: No focal deficit present. Mental Status: He is alert and oriented to person, place, and time. Mental status is at baseline. Psychiatric:         Mood and Affect: Mood normal.         Behavior: Behavior normal.         Thought Content:  Thought content normal.         Judgment: Judgment normal.       Investigations:      Laboratory Testing:  Recent Results (from the past 24 hour(s))   EKG 12 Lead    Collection Time: 09/22/22  4:41 AM   Result Value Ref Range    Ventricular Rate 69 BPM    Atrial Rate 69 BPM    P-R Interval 136 ms    QRS Duration 106 ms    Q-T Interval 396 ms    QTc Calculation (Bazett) 424 ms    P Axis 48 degrees    R Axis 66 degrees    T Axis 49 degrees   CBC with Auto Differential    Collection Time: 09/22/22  4:48 AM   Result Value Ref Range    WBC 9.8 3.5 - 11.3 k/uL    RBC 5.26 4.21 - 5.77 m/uL    Hemoglobin 14.9 13.0 - 17.0 g/dL    Hematocrit 43.5 40.7 - 50.3 %    MCV 82.7 82.6 - 102.9 fL    MCH 28.3 25.2 - 33.5 pg    MCHC 34.3 28.4 - 34.8 g/dL    RDW 12.4 11.8 - 14.4 %    Platelets 269 775 - 976 k/uL    MPV 10.2 8.1 - 13.5 fL    NRBC Automated 0.0 0.0 per 100 WBC    Seg Neutrophils 39 36 - 65 %    Lymphocytes 50 (H) 24 - 43 %    Monocytes 6 3 - 12 %    Eosinophils % 5 (H) 1 - 4 %    Basophils 0 0 - 2 %    Immature Granulocytes 0 0 %    Segs Absolute 3.81 1.50 - 8.10 k/uL    Absolute Lymph # 4.86 (H) 1.10 - 3.70 k/uL    Absolute Mono # 0.56 0.10 - 1.20 k/uL    Absolute Eos # 0.52 (H) 0.00 - 0.44 k/uL    Basophils Absolute 0.03 0.00 - 0.20 k/uL    Absolute Immature Granulocyte 0.02 0.00 - 0.30 k/uL   Basic Metabolic Panel    Collection Time: 09/22/22  4:48 AM   Result Value Ref Range    Glucose 127 (H) 70 - 99 mg/dL    BUN 15 6 - 20 mg/dL    Creatinine 1.06 0.70 - 1.20 mg/dL    Bun/Cre Ratio 14 9 - 20    Calcium 9.0 8.6 - 10.4 mg/dL    Sodium 135 135 - 144 mmol/L    Potassium 3.5 (L) 3.7 - 5.3 mmol/L    Chloride 99 98 - 107 mmol/L    CO2 26 20 - 31 mmol/L    Anion Gap 10 9 - 17 mmol/L    GFR Non-African American >60 >60 mL/min    GFR African American >60 >60 mL/min    GFR Comment         TROP/MYOGLOBIN    Collection Time: 09/22/22  4:48 AM   Result Value Ref Range    Troponin, High Sensitivity <6 0 - 22 ng/L    Myoglobin 41 28 - 72 ng/mL   Lipid Panel    Collection Time: 09/22/22  4:48 AM   Result Value Ref Range    Cholesterol 184 <200 mg/dL    HDL 36 (L) >40 mg/dL    LDL Cholesterol 98 0 - 130 mg/dL    Chol/HDL Ratio 5.1 (H) <5    Triglycerides 252 (H) <150 mg/dL   Magnesium    Collection Time: 09/22/22  4:48 AM   Result Value Ref Range    Magnesium 1.9 1.6 - 2.6 mg/dL   TSH with Reflex    Collection Time: 09/22/22  4:48 AM   Result Value Ref Range    TSH 9.68 (H) 0.30 - 5.00 uIU/mL   T4, Free    Collection Time: 09/22/22  4:48 AM   Result Value Ref Range    Thyroxine, Free 1.24 0.93 - 1.70 ng/dL   TROP/MYOGLOBIN    Collection

## 2022-09-22 NOTE — ED PROVIDER NOTES
[facility]      Pt Name: Uday Jules  MRN: [id number]  [place] 1970  Date of evaluation: 9/22/2022  Provider: Jeff Vazquez MD    CHIEF COMPLAINT       Chief Complaint   Patient presents with    Loss of Consciousness    Chest Pain         HISTORY OF PRESENT ILLNESS  (Location/Symptom, Timing/Onset, Context/Setting, Quality, Duration, Modifying Factors, Severity.)   Uday Jules is a 47 y.o. male who presents to the emergency department for syncope and right-sided chest pain. He awoke this morning and felt dizzy as he was urinating and he passed out. His wife heard a noise and checked on him and she states he was on the bathroom floor and had passed out. He had hit his head but it does not hurt at all now. No neck pain. The right side of his chest hurts and it feels like it is on the inside. No palpitations or fever or vomiting. Nothing like this has happened previously. Nursing Notes were reviewed. ALLERGIES     Patient has no known allergies.     CURRENT MEDICATIONS       Discharge Medication List as of 9/22/2022  7:47 PM        CONTINUE these medications which have NOT CHANGED    Details   famotidine (PEPCID) 20 MG tablet Take 20 mg by mouth daily as needed (heartburn)Historical Med      Ascorbic Acid (VITAMIN C PO) Take 1 tablet by mouth DailyHistorical Med      Omega-3 Fatty Acids (FISH OIL PO) Take 1 capsule by mouth DailyHistorical Med      Cholecalciferol (VITAMIN D3 PO) Take 1 tablet by mouth DailyHistorical Med      VITAMIN E PO Take 1 capsule by mouth DailyHistorical Med      ZINC PO Take 1 tablet by mouth DailyHistorical Med      b complex vitamins capsule Take 1 capsule by mouth dailyHistorical Med      ARTIFICIAL TEAR SOLUTION OP Place 2-3 drops into both eyes daily as neededHistorical Med      cetirizine (ZYRTEC) 10 MG tablet Take 10 mg by mouth dailyHistorical Med      rosuvastatin (CRESTOR) 5 MG tablet Take 10 mg by mouth daily Historical Med             PAST MEDICAL HISTORY         Diagnosis Date    Allergic rhinitis     Dyslipidemia     GERD (gastroesophageal reflux disease)     Helicobacter pylori (H. pylori) infection     per EGD    Hyperlipidemia        SURGICAL HISTORY           Procedure Laterality Date    COLONOSCOPY  2012    normal    EYE SURGERY      UPPER GASTROINTESTINAL ENDOSCOPY  2012    + H Pylori         FAMILY HISTORY           Problem Relation Age of Onset    Diabetes Mother     Emphysema Mother     Hypertension Mother     Coronary Art Dis Mother     COPD Father      Family Status   Relation Name Status    Mother  Alive    Father          SOCIAL HISTORY      reports that he has never smoked. He has never used smokeless tobacco. He reports current alcohol use of about 3.0 standard drinks per week. He reports that he does not use drugs. REVIEW OF SYSTEMS    (2-9 systems for level 4, 10 or more for level 5)     Review of Systems   Constitutional:  Negative for chills, fatigue and fever. HENT:  Negative for congestion, ear discharge and facial swelling. Eyes:  Negative for discharge and redness. Respiratory:  Negative for cough and shortness of breath. Cardiovascular:  Negative for chest pain. Gastrointestinal:  Negative for abdominal pain, constipation, diarrhea and vomiting. Genitourinary:  Negative for dysuria and hematuria. Musculoskeletal:  Negative for arthralgias. Skin:  Negative for color change and rash. Neurological:  Negative for syncope, numbness and headaches. Hematological:  Negative for adenopathy. Psychiatric/Behavioral:  Negative for confusion. The patient is not nervous/anxious. Except as noted above the remainder of the review of systems was reviewed and negative.      PHYSICAL EXAM    (up to 7 for level 4, 8 or more for level 5)     Vitals:    22 0615 22 0900 22 0922 22 1144   BP: 118/86 (!) 135/98 133/76    Pulse: 65 71 74    Resp: 16 16 17    Temp:   97.5 °F (36.4 °C)    TempSrc:   Oral    SpO2: 98% 98% 100% 99%   Weight:       Height:           Physical Exam  Vitals reviewed. Constitutional:       General: He is not in acute distress. Appearance: He is well-developed. He is not diaphoretic. HENT:      Head: Normocephalic and atraumatic. Eyes:      General: No scleral icterus. Right eye: No discharge. Left eye: No discharge. Cardiovascular:      Rate and Rhythm: Normal rate and regular rhythm. Pulmonary:      Effort: Pulmonary effort is normal. No respiratory distress. Breath sounds: Normal breath sounds. No stridor. No wheezing or rales. Comments: Chest wall is not tender and there is no crepitus bruise or abrasion. Chest:      Chest wall: No tenderness. Abdominal:      General: There is no distension. Palpations: Abdomen is soft. Tenderness: There is no abdominal tenderness. Musculoskeletal:         General: Normal range of motion. Cervical back: Neck supple. Lymphadenopathy:      Cervical: No cervical adenopathy. Skin:     General: Skin is warm and dry. Findings: No erythema or rash. Neurological:      Mental Status: He is alert and oriented to person, place, and time.    Psychiatric:         Behavior: Behavior normal.           DIAGNOSTIC RESULTS     EKG: All EKG's are interpreted by the Emergency Department Physician who either signs or Co-signs this chart in the absence of a cardiologist.    EKG on my interpretation shows normal sinus rhythm without acute change    RADIOLOGY:   Non-plain film images such as CT, Ultrasound and MRI are read by the radiologist. Plain radiographic images are visualized and preliminarily interpreted by the emergency physician with the below findings:    Interpretation per the Radiologist below, if available at the time of this note:        LABS:  Labs Reviewed   CBC WITH AUTO DIFFERENTIAL - Abnormal; up      Nguy[address]n Carter[address]    Schedule an appointment as soon as possible for a visit  you are to schedule with Dr Watson Hammer for outpatient echo    DISCHARGE MEDICATIONS:     Discharge Medication List as of 9/22/2022  7:47 PM        START taking these medications    Details   cyclobenzaprine (FLEXERIL) 5 MG tablet Take 1 tablet by mouth 3 times daily as needed for Muscle spasms, Disp-10 tablet, R-0Normal      fluticasone (FLONASE) 50 MCG/ACT nasal spray 2 sprays by Each Nostril route daily for 4 days, Disp-1 each, R-1Normal      oxyCODONE-acetaminophen (PERCOCET) 5-325 MG per tablet Take 1 tablet by mouth every 8 hours as needed for Pain for up to 3 days. , Disp-3 tablet, R-0Normal      polyethylene glycol (GLYCOLAX) 17 g packet Take 17 g by mouth daily as needed for Constipation, Disp-3 each, R-0Normal             The care is provided during an unprecedented national emergency due to the novel coronavirus, COVID-19.     (Please note that portions of this note were completed with a voice recognition program.  Efforts were made to edit the dictations but occasionally words are mis-transcribed.)    Aliza Palencia MD  Attending Emergency Physician           Aliza Palencia MD  10/12/22 [id number]

## 2022-09-22 NOTE — CONSULTS
Cardiovascular Consult Note     TODAY'S DATE: 9/22/2022    Patient name: Chun Wallace   YOB: 1970  Date of admission:  9/22/2022       Patient seen, examined. Previous clinical entries reviewed. All available laboratory, imaging and ancillary data reviewed. Reason for Consult: Syncope  Referring Physician: Dr. Drew Huitron MD    History of present Illness:     Chun Wallace is a 47 y.o. male with past history significant for hyperlipidemia who was brought to the emergency room at [facility] where he had a syncopal episode at home. Earlier this morning he had dizziness and lightheadedness he woke up from his sleep and had a glass of water prior to going to the bathroom. While he was sitting on the toilet, he had dizziness and lightheadedness with his head spinning and apparently that is the last thing he remembers. His wife remembers a sound in the bedroom and woke up and found him on the floor. He was slow to respond after the event. No clear postictal symptoms. He apparently hit a dresser and has some pain or his scalp area which is getting better. He also has a new right chest wall tenderness after the fall. He was slow to respond after the fall and was brought to emergency room. He has not had any similar episodes in the past.  Routine work-up in the emergency room showed a normal electrocardiogram.  His cardiac markers were negative. He did have a CT scan of the head which is negative for bleed. His initial pressure in the emergency room was reportedly normal.    Past Medical History:    has a past medical history of Allergic rhinitis, Dyslipidemia, GERD (gastroesophageal reflux disease), Helicobacter pylori (H. pylori) infection, and Hyperlipidemia.     Surgical History:     Past Surgical History:   Procedure Laterality Date    COLONOSCOPY  11/13/2012    normal    UPPER GASTROINTESTINAL ENDOSCOPY  11/13/2012    + H Pylori GLUCOSE 127*       Imaging:    CXR: No acute infiltrates. Echo: Pending. EKG: Normal sinus rhythm. Normal EKG. Impression :     Syncopal episode-etiology unclear. Controlled dizziness-etiology unclear  Hyperlipidemia. Right-sided chest wall pain. Anemia    Plan :     Orthostatics. Start IV hydration with 0.9 normal saline at 75 cc/h for the next 12 hours. We will get an echocardiogram to rule out structural heart abnormalities. Check TSH, hemoglobin A1c and fasting lipids. And potassium. Check magnesium level. Recommend neurologic evaluation for central dizziness with a follow-up MRI to rule out posterior circulation TIA. Thank you very much for allowing us to participate in the care of this patient. Please call us with any questions.     Electronically signed by Mac Raymond MD on 9/22/2022 at 10:32 AM

## 2022-09-22 NOTE — PROGRESS NOTES
Physical Therapy  DATE: 2022    NAME: Mateo Price  MRN: [id number]   : 1970    Patient not seen this date for Physical Therapy due to:      [] Cancel by RN or physician due to:    [] Hemodialysis    [] Critical Lab Value Level     [] Blood transfusion in progress    [] Acute or unstable cardiovascular status   _MAP < 55 or more than >115  _HR < 40 or > 130    [] Acute or unstable pulmonary status   -FiO2 > 60%   _RR < 5 or >40    _O2 sats < 85%    [] Strict Bedrest    [] Off Unit for surgery or procedure    [] Off Unit for testing       [] Pending imaging to R/O fracture    [] Refusal by Patient      [] Other      [x] PT being discontinued at this time. Patient independent. No further needs. Writer spoke w/ pt and spouse Jaziel Keita RN present at bedside. Pt & pt's spouse denying any needs, reporting independence w/ functional mobility and stating he has been up w/o assist throughout admission. PT will defer evaluation at this time. Please re-order skilled PT if functional mobility status changes. [] PT being discontinued at this time as the patient has been transferred to hospice care. No further needs.       Walter Arechiga, PT

## 2022-09-22 NOTE — PLAN OF CARE
Attending Supervising Physician's Attestation Statement  I performed a history and physical examination on the patient and discussed the management with the nurse practitioner. I reviewed and agree with the findings and plan as documented in his note . This is a very pleasant 47year old male with past medical history of GERD, HLD, Allergic rhinitis presents to the ER with sycopal episode. Patient awoke this morning feeling dehydrated, went to the kitched, had some water, ambulated to restroom and urinated. Patient remembers feeling weak sitting on the toilet but does not remember events afterwards. Spouse found patient lying in the hallway, unfortunately seems to have hit his head on the wall. Patient currently has some chest wall tenderness on the right side when laying flat, most pronounced on inspiration. No chest wall tenderness on palpation to area. Appreciate Cardiology recommendations. Follow up echo, Hypertriglyceridemia noted on blood work, replace potassium 1 time dose. Follow up carotid ultrasound. Patient has history of allergic rhinitis takes zyrtec at home, start flonase here, will also add muscle relaxers and pain medication for chest wall pain.     Electronically signed by Shraddha Adkins MD on 9/22/22 at 10:21 AM EDT

## 2022-09-22 NOTE — PLAN OF CARE
Problem: Discharge Planning  Goal: Discharge to home or other facility with appropriate resources  Outcome: Not Progressing     Problem: Safety - Adult  Goal: Free from fall injury  Outcome: Not Progressing     Problem: Pain  Goal: Verbalizes/displays adequate comfort level or baseline comfort level  Outcome: Not Progressing     Problem: Discharge Planning  Goal: Discharge to home or other facility with appropriate resources  Outcome: Not Progressing     Problem: Safety - Adult  Goal: Free from fall injury  Outcome: Not Progressing     Problem: Pain  Goal: Verbalizes/displays adequate comfort level or baseline comfort level  Outcome: Not Progressing

## 2022-09-22 NOTE — ED PROVIDER NOTES
ADDENDUM:        Care of this patient was assumed from Dr. Chasidy Carlin    at   [id number]    . The patient was seen for Loss of Consciousness and Chest Pain  . The patient's initial evaluation and plan have been discussed with the prior provider who initially evaluated the patient. Nursing Notes, Past Medical Hx, Past Surgical Hx, Social Hx, Allergies, and Family Hx were all reviewed. I performed a repeat evaluation of the patient and reviewed tests completed so far. The patient was endorsed to me pending CT imaging results as well as reevaluation. Patient 79-year-old male presented to the emergency room secondary to a witnessed syncopal episode. No seizure-like activity. Patient also complains of dizziness. Patient's cardiologist is Dr. Tellez Band will follow patient on admission. Internal medicine service paged for admission.     ED Course        The patient was given the following medications:  Orders Placed This Encounter   Medications    DISCONTD: cetirizine (ZYRTEC) tablet 10 mg    DISCONTD: famotidine (PEPCID) tablet 20 mg    DISCONTD: rosuvastatin (CRESTOR) tablet 10 mg    DISCONTD: 0.9 % sodium chloride infusion    DISCONTD: sodium chloride flush 0.9 % injection 5-40 mL    DISCONTD: sodium chloride flush 0.9 % injection 10 mL    DISCONTD: 0.9 % sodium chloride infusion    DISCONTD: potassium chloride (KLOR-CON M) extended release tablet 40 mEq    DISCONTD: potassium bicarb-citric acid (EFFER-K) effervescent tablet 40 mEq    DISCONTD: potassium chloride 10 mEq/100 mL IVPB (Peripheral Line)    DISCONTD: magnesium sulfate 1000 mg in dextrose 5% 100 mL IVPB    DISCONTD: enoxaparin (LOVENOX) injection 40 mg     Order Specific Question:   Indication of Use     Answer:   Prophylaxis-DVT/PE    DISCONTD: ondansetron (ZOFRAN-ODT) disintegrating tablet 4 mg    DISCONTD: ondansetron (ZOFRAN) injection 4 mg    DISCONTD: polyethylene glycol (GLYCOLAX) packet 17 g    DISCONTD: acetaminophen (TYLENOL) tablet 650 mg DISCONTD: acetaminophen (TYLENOL) suppository 650 mg    DISCONTD: perflutren lipid microspheres (DEFINITY) injection 1.65 mg    potassium chloride (KLOR-CON M) extended release tablet 40 mEq    DISCONTD: 0.9 % sodium chloride infusion    DISCONTD: fluticasone (FLONASE) 50 MCG/ACT nasal spray 2 spray    DISCONTD: cyclobenzaprine (FLEXERIL) tablet 5 mg    DISCONTD: oxyCODONE-acetaminophen (PERCOCET) 5-325 MG per tablet 1 tablet    cyclobenzaprine (FLEXERIL) 5 MG tablet     Sig: Take 1 tablet by mouth 3 times daily as needed for Muscle spasms     Dispense:  10 tablet     Refill:  0    fluticasone (FLONASE) 50 MCG/ACT nasal spray     Si sprays by Each Nostril route daily for 4 days     Dispense:  1 each     Refill:  1    oxyCODONE-acetaminophen (PERCOCET) 5-325 MG per tablet     Sig: Take 1 tablet by mouth every 8 hours as needed for Pain for up to 3 days. Dispense:  3 tablet     Refill:  0     Reduce doses taken as pain becomes manageable    polyethylene glycol (GLYCOLAX) 17 g packet     Sig: Take 17 g by mouth daily as needed for Constipation     Dispense:  3 each     Refill:  0       RECENT VITALS:  BP: 133/76, Temp: 97.5 °F (36.4 °C), Heart Rate: 74, Resp: 17     RADIOLOGY:All plain film, CT, MRI, and formal ultrasound images (except ED bedside ultrasound) are read by the radiologist and the images and interpretations are directly viewed by the emergency physician. MRI BRAIN WO CONTRAST   Final Result   1. No acute intracranial abnormality. 2. Microangiopathic change. VL Carotid Bilateral   Final Result      CT HEAD WO CONTRAST   Final Result   No acute intracranial abnormality. Severe pansinus polypoid mucosal thickening. XR CHEST PORTABLE   Final Result   Clear chest without acute cardiopulmonary process. LABS: All lab results were reviewed by myself, and all abnormals are listed below.   Labs Reviewed   CBC WITH AUTO DIFFERENTIAL - Abnormal; Notable for the following components:       Result Value    Lymphocytes 50 (*)     Eosinophils % 5 (*)     Absolute Lymph # 4.86 (*)     Absolute Eos # 0.52 (*)     All other components within normal limits   BASIC METABOLIC PANEL - Abnormal; Notable for the following components:    Glucose 127 (*)     Potassium 3.5 (*)     All other components within normal limits   LIPID PANEL - Abnormal; Notable for the following components:    HDL 36 (*)     Chol/HDL Ratio 5.1 (*)     Triglycerides 252 (*)     All other components within normal limits   TSH WITH REFLEX - Abnormal; Notable for the following components:    TSH 9.68 (*)     All other components within normal limits   TROP/MYOGLOBIN   TROP/MYOGLOBIN   HEMOGLOBIN A1C   MAGNESIUM   T4, FREE   BRAIN NATRIURETIC PEPTIDE           Disposition   DISPOSITION:    DISPOSITION Admitted 09/22/2022 08:05:48 AM      CLINICAL IMPRESSION:  1. Fall from standing, initial encounter    2.  Syncope and collapse        PATIENT REFERRED TO:  Sowmya Harrell MD  [facility] [address]  [address]    Schedule an appointment as soon as possible for a visit  follow up immediately after discharge    [address] [facility], [facility] [place] [phone]    Follow up      [address]  [phone]    Schedule an appointment as soon as possible for a visit  you are to schedule with Dr Yogesh Carlson for outpatient echo    DISCHARGE MEDICATIONS:  Discharge Medication List as of 9/22/2022  7:47 PM        START taking these medications    Details   cyclobenzaprine (FLEXERIL) 5 MG tablet Take 1 tablet by mouth 3 times daily as needed for Muscle spasms, Disp-10 tablet, R-0Normal      fluticasone (FLONASE) 50 MCG/ACT nasal spray 2 sprays by Each Nostril route daily for 4 days, Disp-1 each, R-1Normal      oxyCODONE-acetaminophen (PERCOCET) 5-325 MG per tablet Take 1 tablet by mouth every 8 hours as needed for Pain for up to 3 days. , Disp-3 tablet, R-0Normal      polyethylene glycol (GLYCOLAX) 17 g packet Take 17 g by mouth daily as needed for Constipation, Disp-3 each, R-0Normal           The care is provided during an unprecedented national emergency due to the novel coronavirus, COVID 19.   Rigoberto Rucker MD  Attending Emergency Physician              Rigoberto Rucker MD  22/76/74 3919

## 2022-09-22 NOTE — PLAN OF CARE
Problem: Discharge Planning  Goal: Discharge to home or other facility with appropriate resources  9/22/2022 1938 by Linus Hartley RN  Outcome: Adequate for Discharge  Flowsheets (Taken 9/22/2022 1720 by Miladys Ryder RN)  Discharge to home or other facility with appropriate resources:   Identify barriers to discharge with patient and caregiver   Arrange for needed discharge resources and transportation as appropriate   Identify discharge learning needs (meds, wound care, etc)   Refer to discharge planning if patient needs post-hospital services based on physician order or complex needs related to functional status, cognitive ability or social support system  9/22/2022 1143 by Miladys Ryder RN  Outcome: Not Progressing     Problem: Discharge Planning  Goal: Discharge to home or other facility with appropriate resources  9/22/2022 1938 by Linus Hartley RN  Outcome: Adequate for Discharge  Flowsheets (Taken 9/22/2022 1720 by Miladys Ryder RN)  Discharge to home or other facility with appropriate resources:   Identify barriers to discharge with patient and caregiver   Arrange for needed discharge resources and transportation as appropriate   Identify discharge learning needs (meds, wound care, etc)   Refer to discharge planning if patient needs post-hospital services based on physician order or complex needs related to functional status, cognitive ability or social support system  9/22/2022 1143 by Miladys Ryder RN  Outcome: Not Progressing     Problem: Safety - Adult  Goal: Free from fall injury  9/22/2022 1938 by Linus Hartley RN  Outcome: Adequate for Discharge  9/22/2022 1143 by Miladys Ryder RN  Outcome: Not Progressing     Problem: Pain  Goal: Verbalizes/displays adequate comfort level or baseline comfort level  9/22/2022 1938 by Linus Hartley RN  Outcome: Adequate for Discharge  9/22/2022 1143 by Miladys Ryder RN  Outcome: Not Progressing

## 2022-09-22 NOTE — PROGRESS NOTES
The patient arrived to the room from ED; awake and alert and oriented to the room, call light, bed mechanics and safety. Bed alarm on, wife at the bedside.

## 2022-09-22 NOTE — DISCHARGE INSTR - DIET

## 2022-09-22 NOTE — PROGRESS NOTES
Transitions of Care Pharmacy Service   Medication Review    The patient's list of current home medications was reviewed earlier today. Source(s) of information: patient, Surescripts refill report      Please feel free to call me with any questions about this encounter. Thank you.     Francia Helm [address] [place] [address]   Transitions of Care Pharmacy Service  Phone:  [phone]  Fax: [phone]      Electronically signed by Francia Helm [address] [place] [address] on 9/22/2022 at 5:18 PM           Medications Prior to Admission:   famotidine (PEPCID) 20 MG tablet, Take 20 mg by mouth daily as needed (heartburn)  Ascorbic Acid (VITAMIN C PO), Take 1 tablet by mouth Daily  Omega-3 Fatty Acids (FISH OIL PO), Take 1 capsule by mouth Daily  Cholecalciferol (VITAMIN D3 PO), Take 1 tablet by mouth Daily  VITAMIN E PO, Take 1 capsule by mouth Daily  ZINC PO, Take 1 tablet by mouth Daily  b complex vitamins capsule, Take 1 capsule by mouth daily  ARTIFICIAL TEAR SOLUTION OP, Place 2-3 drops into both eyes daily as needed  cetirizine (ZYRTEC) 10 MG tablet, Take 10 mg by mouth daily  rosuvastatin (CRESTOR) 5 MG tablet, Take 10 mg by mouth daily

## 2022-09-22 NOTE — ED NOTES
ED to inpatient nurses report     Chief Complaint   Patient presents with    Loss of Consciousness    Chest Pain    Pt presents to ED via private auto with c/o syncopal episode and right side chest pain. Wife states she heard a loud noise and found pt on ground by bathroom. Pt states he does not remember falling. Upon arrival to ED pt alert and oriented x4. Pt denies SOB. Pt denies head pain. Pt afebrile, vitals stable.  Pt able to ambulate without assist.  Present to ED from   LOC: a&ox4  Vital signs   Vitals:    09/22/22 0530 09/22/22 0545 09/22/22 0600 09/22/22 0615   BP: 108/86 117/83 119/83 118/86   Pulse: 66 65 65 65   Resp: 18 15 20 16   Temp:       TempSrc:       SpO2: 98% 98% 100% 98%   Weight:       Height:          Oxygen Baseline room air     Current needs required    LDAs:   Peripheral IV 09/22/22 Left Forearm (Active)   Site Assessment Clean, dry & intact 09/22/22 0451   Line Status Blood return noted 09/22/22 0451   Phlebitis Assessment No symptoms 09/22/22 0451   Infiltration Assessment 0 09/22/22 0451     Mobility: ambulatory  Fall Risk:    Pending ED orders:none   Present condition: stable   Code Status: full  Consults: IP CONSULT TO INTERNAL MEDICINE  IP CONSULT TO CARDIOLOGY  [x]  Hospitalist  Completed  [] yes [] no Who:   []  Medicine  Completed  [] yes [] No Who:   [x]  Cardiology  Completed  [] yes [] No Who:   []  GI   Completed  [] yes [] No Who:   []  Neurology  Completed  [] yes [] No Who:   []  Nephrology Completed  [] yes [] No Who:    []  Vascular  Completed  [] yes [] No Who:   []  Ortho  Completed  [] yes [] No Who:     []  Surgery  Completed  [] yes [] No Who:    []  Urology  Completed  [] yes [] No Who:    []  CT Surgery Completed  [] yes [] No Who:   []  Podiatry  Completed  [] yes [] No Who:    []  Other    Completed  [] yes [] No Who:  Interventions:   Important Events:        Electronically signed by Orlando Faustin RN on 9/22/2022 at 7:33 AM     Arnie Castillo RN  09/22/22 6823

## 2022-09-22 NOTE — ED NOTES
Pt presents to ED via private auto with c/o syncopal episode and right side chest pain. Wife states she heard a loud noise and found pt on ground by bathroom. Pt states he does not remember falling. Upon arrival to ED pt alert and oriented x4. Pt denies SOB. Pt denies head pain. Pt afebrile, vitals stable.  Pt able to ambulate without assist.       Nayeli Cuellar RN  09/22/22 2995

## 2022-09-22 NOTE — CONSULTS
Neurology Comments:    I came to see the patient on 9/23/2022- He was already discharged.     Thank you

## 2022-09-22 NOTE — CARE COORDINATION
Case Management Initial Discharge Plan  [place], [place] with:patient or spouse/SO to discuss discharge plans. Information verified: address, contacts, phone number, , insurance Yes  PCP: Emmanuel Newberry MD  Date of last visit: 2021    Insurance Provider: [address] [place] [address] Drive    Discharge Planning    Living Arrangements:   Wife and children   Support Systems:   family, friends    Home has 2 stories  1 stairs to climb to get into front door, 13-14 stairs to climb to reach second floor  Location of bedroom/bathroom in home  - main floor    Patient able to perform ADL's:Independent    Current Services (outpatient & in home) none  DME equipment: none  DME provider: N/A     [facility]    Potential Assistance Needed:   None    Patient agreeable to home care: No  [place] of choice provided:  n/a    Prior SNF/Rehab Placement and Facility: No  Agreeable to SNF/Rehab: No  [place] of choice provided: n/a   Evaluation: n/a    Expected Discharge date:     Patient expects to be discharged to:   home  Follow Up Appointment: Best Day/ Time:      Transportation provider: wife  Transportation arrangements needed for discharge: No    Readmission Risk              Risk of Unplanned Readmission:  0             Does patient have a readmission risk score greater than 14?: No  If yes, follow-up appointment must be made within 7 days of discharge. Goal of Care:       Discharge Plan: Met with patient and wife at bedside. Lives with wife and kids, independent and works full time. Wife is RN on [employer] unit at [employer]. Admitted for syncope and collapse. Pt stated passed out at home during the night while up in bathroom. Cardiology and neuro consulted. Echo and carotid scan pending. Continue to follow and no home needs anticipated.            Electronically signed by Naren Saldaña RN on 22 at 1:26 PM EDT

## 2022-09-23 LAB
EKG ATRIAL RATE: 69 BPM
EKG P AXIS: 48 DEGREES
EKG P-R INTERVAL: 136 MS
EKG Q-T INTERVAL: 396 MS
EKG QRS DURATION: 106 MS
EKG QTC CALCULATION (BAZETT): 424 MS
EKG R AXIS: 66 DEGREES
EKG T AXIS: 49 DEGREES
EKG VENTRICULAR RATE: 69 BPM

## 2022-09-23 NOTE — DISCHARGE SUMMARY
[facility]  Office: [address], DO, Mindyrody Miguel, DO, Breannamarina Handy, DO, Danablaze Yoder, DO, Armani Hilliard MD, Taz Segundo MD, Sylwia Elkins MD, Matthew Townsend MD,  Nury Cancino MD, Farrukh [place]wood MD, Fior Ortega, DO, Genoveva Williamson MD,  Farnaz Carmona DO, Wilma Bell MD, Mónica Hess MD, Floyd Escalera, DO, Gonzalez Clemente MD, Cuate Goldstein MD, Makayla Riddle MD, Joshua Multani MD, Trace Perales MD, Yoel Jensen MD, Rashmi Limon, DO, Hina Jenkins MD, Dietrich Halsted, MD, Rola Wyman, CNP,  Syd Abraham, CNP, Rehan Dodson, CNP, Shruti Felipe, CNP,  Maverick Tan, [facility], Souleymane Perdue, CNP, Venecia Navarrete, CNP, Gayle Rosen, CNP, Tracey Akers, CNP, Lucero Garcia, CNP, Allyson Anaya, PA-C, Mana Doshi, CNS, Deedee Centeno, [facility], Leonardo Ronquillo, CNP, Edie Rocha, CNP, Ady Hernández, [facility]    Discharge Summary     Patient ID: Tanya Matthew  :  1970   MRN: [id number]     ACCOUNT:  [de-identified]   Patient's PCP: Irais Chong MD  Admit Date: 2022   Discharge Date: 2022     Length of Stay: 0  Code Status:  Prior  Admitting Physician: Genoveva Williamson MD  Discharge Physician: Genoveva Williamson MD     Active Discharge Diagnoses:     Hospital Problem Lists:  Principal Problem:    Syncope and collapse  Active Problems:    Dyslipidemia    Allergic rhinitis  Resolved Problems:    * No resolved hospital problems. *      Admission Condition:  stable     Discharged Condition: stable    Hospital Stay:     Hospital Course:  [address] [facility] Yvette Daley is a 47 y.o. male who was admitted for the management of  Syncope and collapse , presented to ER with Loss of Consciousness and Chest Pain    This is a very pleasant 47year old male with past medical history of GERD, HLD, Allergic rhinitis presents to the ER with sycopal episode.  Patient awoke Date: 9/22/2022  Clear chest without acute cardiopulmonary process. MRI BRAIN WO CONTRAST    Result Date: 9/22/2022  1. No acute intracranial abnormality. 2. Microangiopathic change. Consultations:    Consults:     Final Specialist Recommendations/Findings:   IP CONSULT TO INTERNAL MEDICINE  IP CONSULT TO CARDIOLOGY  IP CONSULT TO SOCIAL WORK  IP CONSULT TO NEUROLOGY      The patient was seen and examined on day of discharge and this discharge summary is in conjunction with any daily progress note from day of discharge. Discharge plan:     Disposition: Home    Physician Follow Up:     Irais Chong MD  [place] [address]  [address] [facility] [address]  [phone]    Schedule an appointment as soon as possible for a visit  follow up immediately after discharge    Irais Chong MD  [address] [address]  [phone]    Follow up      [place] [address]  [phone]    Schedule an appointment as soon as possible for a visit  you are to schedule with Dr Naina Hobson for outpatient echo       Requiring Further Evaluation/Follow Up POST HOSPITALIZATION/Incidental Findings: follow up PCP follow up cardiology    Diet: cardiac diet    Activity: As tolerated    Instructions to Patient: follow up PCP follow up cardiology    Discharge Medications:      Medication List        START taking these medications      cyclobenzaprine 5 MG tablet  Commonly known as: FLEXERIL  Take 1 tablet by mouth 3 times daily as needed for Muscle spasms     fluticasone 50 MCG/ACT nasal spray  Commonly known as: FLONASE  2 sprays by Each Nostril route daily for 4 days     oxyCODONE-acetaminophen 5-325 MG per tablet  Commonly known as: PERCOCET  Take 1 tablet by mouth every 8 hours as needed for Pain for up to 3 days.      polyethylene glycol 17 g packet  Commonly known as: GLYCOLAX  Take 17 g by mouth daily as needed for Constipation            CHANGE how you take these medications      famotidine 20 MG tablet  Commonly known as: PEPCID  What changed: Another medication with the same name was removed. Continue taking this medication, and follow the directions you see here. CONTINUE taking these medications      ARTIFICIAL TEAR SOLUTION OP     b complex vitamins capsule     cetirizine 10 MG tablet  Commonly known as: ZYRTEC     FISH OIL PO     rosuvastatin 5 MG tablet  Commonly known as: CRESTOR     VITAMIN C PO     VITAMIN D3 PO     VITAMIN E PO     ZINC PO               Where to Get Your Medications        These medications were sent to [id number] [address]  [address] [place] [address]      Phone: [phone]   cyclobenzaprine 5 MG tablet  fluticasone 50 MCG/ACT nasal spray  oxyCODONE-acetaminophen 5-325 MG per tablet  polyethylene glycol 17 g packet         No discharge procedures on file. Time Spent on discharge is  20 mins in patient examination, evaluation, counseling as well as medication reconciliation, prescriptions for required medications, discharge plan and follow up. Electronically signed by   Cindi Storm MD  9/23/2022  6:16 PM      Thank you Dr. Bryson Seip, MD for the opportunity to be involved in this patient's care.

## 2022-09-27 ENCOUNTER — TELEPHONE (OUTPATIENT)
Dept: FAMILY MEDICINE CLINIC | Age: 52
End: 2022-09-27

## 2022-09-27 NOTE — TELEPHONE ENCOUNTER
----- Message from Marley Nair sent at 9/26/2022  3:55 PM EDT -----  Subject: Referral Request    Reason for referral request? Patient needs a referral for a neurologist as   soon as possible as recommended by the cardiologist Dr Nuvia Mata. Something   was seen on his MRI or ct scan and so he was told he needs to see a   neurologist. Please call and let patient know if he can get this referral   soon and who he will be referred to. Provider patient wants to be referred to(if known):     Provider Phone Number(if known): Additional Information for Provider?   ---------------------------------------------------------------------------  --------------  [address]    [phone]; OK to leave message on voicemail  ---------------------------------------------------------------------------    QUESTIONS   Information for Provider? Patient had his labs done recently at [facility], lab results are in his mychart, he was told he may need to have the   thyroid checked again. Patient would like provider to look over the labs   and call and advise if he needs to go get them done again.

## 2022-09-27 NOTE — TELEPHONE ENCOUNTER
I spoke with the patient. He went to the [address] [place] [address] on 9-22-22. He had passed out in the morning and hit his head. Please look at ER visit [facility] 9-22-22. He wants to know a good neurologist, which Dr. Glenny Sorensen suggests he see - (look at MRI dated 9-22-22). Will need referral.      Dr. Glenny Sorensen is doing an Echo in his office on 9-28-22, which they couldn't do in hospital    Look at Lipid Henjohn Villanueva),  TSH out of range , Potassium low    Patient concerned about all.      He does have an appointment with you on 10-11-22 at 11:00 am.

## 2022-09-28 DIAGNOSIS — E87.6 HYPOKALEMIA: Primary | ICD-10-CM

## 2022-09-30 DIAGNOSIS — R55 SYNCOPE AND COLLAPSE: Primary | ICD-10-CM

## 2022-09-30 DIAGNOSIS — R79.89 ELEVATED TSH: Primary | ICD-10-CM

## 2022-09-30 NOTE — TELEPHONE ENCOUNTER
Spoke with the patient. Referring to Dr. Terence Tran (neurology) [facility] for syncope  [address] [place] [address] 1, 1st floor  Phone: [phone]  Fax: [phone]    Referring to [facility] Dr. Shaw Files (or soonest provider) for elevated TSH. Patient will get potassium drawn at [address] [facility].

## 2022-10-03 NOTE — TELEPHONE ENCOUNTER
Spoke with patient. He is calling to see who is on his plan (Jamaica Liz) for new patient neurology. He will call me back.

## 2022-10-10 ENCOUNTER — HOSPITAL ENCOUNTER (OUTPATIENT)
Age: 52
Setting detail: SPECIMEN
Discharge: HOME | End: 2022-10-10

## 2022-10-10 DIAGNOSIS — E87.6 HYPOKALEMIA: ICD-10-CM

## 2022-10-10 DIAGNOSIS — Z12.5 SCREENING PSA (PROSTATE SPECIFIC ANTIGEN): ICD-10-CM

## 2022-10-10 DIAGNOSIS — Z00.00 ANNUAL PHYSICAL EXAM: ICD-10-CM

## 2022-10-10 LAB
POTASSIUM SERPL-SCNC: 4.6 MMOL/L (ref 3.7–5.3)
PROSTATE SPECIFIC ANTIGEN: 0.49 NG/ML

## 2022-10-11 ENCOUNTER — OFFICE VISIT (OUTPATIENT)
Dept: FAMILY MEDICINE CLINIC | Age: 52
End: 2022-10-11
Payer: COMMERCIAL

## 2022-10-11 VITALS
DIASTOLIC BLOOD PRESSURE: 76 MMHG | SYSTOLIC BLOOD PRESSURE: 108 MMHG | RESPIRATION RATE: 15 BRPM | HEIGHT: 67 IN | BODY MASS INDEX: 23.92 KG/M2 | WEIGHT: 152.4 LBS | HEART RATE: 80 BPM | TEMPERATURE: 97.4 F

## 2022-10-11 DIAGNOSIS — F32.A ANXIETY AND DEPRESSION: ICD-10-CM

## 2022-10-11 DIAGNOSIS — Z00.00 ANNUAL PHYSICAL EXAM: Primary | ICD-10-CM

## 2022-10-11 DIAGNOSIS — F41.9 ANXIETY AND DEPRESSION: ICD-10-CM

## 2022-10-11 DIAGNOSIS — Z23 NEEDS FLU SHOT: ICD-10-CM

## 2022-10-11 DIAGNOSIS — R40.0 HAS DAYTIME DROWSINESS: ICD-10-CM

## 2022-10-11 DIAGNOSIS — R06.83 LOUD SNORING: ICD-10-CM

## 2022-10-11 DIAGNOSIS — R55 SYNCOPE, UNSPECIFIED SYNCOPE TYPE: ICD-10-CM

## 2022-10-11 PROCEDURE — 90674 CCIIV4 VAC NO PRSV 0.5 ML IM: CPT | Performed by: FAMILY MEDICINE

## 2022-10-11 PROCEDURE — 99396 PREV VISIT EST AGE 40-64: CPT | Performed by: FAMILY MEDICINE

## 2022-10-11 PROCEDURE — 90471 IMMUNIZATION ADMIN: CPT | Performed by: FAMILY MEDICINE

## 2022-10-11 RX ORDER — SERTRALINE HYDROCHLORIDE 25 MG/1
25 TABLET, FILM COATED ORAL DAILY
Qty: 30 TABLET | Refills: 5 | Status: SHIPPED | OUTPATIENT
Start: 2022-10-11 | End: 2022-11-02

## 2022-10-11 SDOH — ECONOMIC STABILITY: FOOD INSECURITY: WITHIN THE PAST 12 MONTHS, YOU WORRIED THAT YOUR FOOD WOULD RUN OUT BEFORE YOU GOT MONEY TO BUY MORE.: NEVER TRUE

## 2022-10-11 SDOH — ECONOMIC STABILITY: FOOD INSECURITY: WITHIN THE PAST 12 MONTHS, THE FOOD YOU BOUGHT JUST DIDN'T LAST AND YOU DIDN'T HAVE MONEY TO GET MORE.: NEVER TRUE

## 2022-10-11 ASSESSMENT — PATIENT HEALTH QUESTIONNAIRE - PHQ9
7. TROUBLE CONCENTRATING ON THINGS, SUCH AS READING THE NEWSPAPER OR WATCHING TELEVISION: 0
6. FEELING BAD ABOUT YOURSELF - OR THAT YOU ARE A FAILURE OR HAVE LET YOURSELF OR YOUR FAMILY DOWN: 0
SUM OF ALL RESPONSES TO PHQ QUESTIONS 1-9: 0
2. FEELING DOWN, DEPRESSED OR HOPELESS: 0
4. FEELING TIRED OR HAVING LITTLE ENERGY: 0
SUM OF ALL RESPONSES TO PHQ QUESTIONS 1-9: 0
SUM OF ALL RESPONSES TO PHQ9 QUESTIONS 1 & 2: 0
3. TROUBLE FALLING OR STAYING ASLEEP: 0
9. THOUGHTS THAT YOU WOULD BE BETTER OFF DEAD, OR OF HURTING YOURSELF: 0
1. LITTLE INTEREST OR PLEASURE IN DOING THINGS: 0
SUM OF ALL RESPONSES TO PHQ QUESTIONS 1-9: 0
8. MOVING OR SPEAKING SO SLOWLY THAT OTHER PEOPLE COULD HAVE NOTICED. OR THE OPPOSITE, BEING SO FIGETY OR RESTLESS THAT YOU HAVE BEEN MOVING AROUND A LOT MORE THAN USUAL: 0
5. POOR APPETITE OR OVEREATING: 0
SUM OF ALL RESPONSES TO PHQ QUESTIONS 1-9: 0
10. IF YOU CHECKED OFF ANY PROBLEMS, HOW DIFFICULT HAVE THESE PROBLEMS MADE IT FOR YOU TO DO YOUR WORK, TAKE CARE OF THINGS AT HOME, OR GET ALONG WITH OTHER PEOPLE: 0

## 2022-10-11 ASSESSMENT — ENCOUNTER SYMPTOMS
SHORTNESS OF BREATH: 0
BLOOD IN STOOL: 0
ABDOMINAL PAIN: 0
CHEST TIGHTNESS: 0

## 2022-10-11 ASSESSMENT — SOCIAL DETERMINANTS OF HEALTH (SDOH): HOW HARD IS IT FOR YOU TO PAY FOR THE VERY BASICS LIKE FOOD, HOUSING, MEDICAL CARE, AND HEATING?: NOT HARD AT ALL

## 2022-10-11 NOTE — PROGRESS NOTES
Subjective:      Patient ID: Buddy Medina is a 47 y.o. male. HPI  Visit Information    Have you changed or started any medications since your last visit including any over-the-counter medicines, vitamins, or herbal medicines? no   Are you having any side effects from any of your medications? -  no  Have you stopped taking any of your medications? Is so, why? -  no    Have you seen any other physician or provider since your last visit? Yes - Records Obtained  Have you had any other diagnostic tests since your last visit? Yes - Records Obtained  Have you been seen in the emergency room and/or had an admission to a hospital since we last saw you? Yes - Records Obtained  Have you had your routine dental cleaning in the past 6 months? yes -     Have you activated your [employer] account? If not, what are your barriers? Yes     Patient Care Team:  Valarie Contreras MD as PCP - General (Family Medicine)  Valarie Contreras MD as PCP - [facility]    Medical History Review  Past Medical, Family, and Social History reviewed and does contribute to the patient presenting condition    Health Maintenance   Topic Date Due    COVID-19 Vaccine (1) Never done    HIV screen  Never done    Hepatitis C screen  Never done    Colorectal Cancer Screen  Never done    Shingles vaccine (1 of 2) Never done    Depression Screen  06/18/2022    Flu vaccine (1) 08/01/2022    DTaP/Tdap/Td vaccine (2 - Td or Tdap) 01/01/2023    Lipids  09/22/2023    Hepatitis A vaccine  Aged Out    Hib vaccine  Aged Out    Meningococcal (ACWY) vaccine  Aged Out    Pneumococcal 0-64 years Vaccine  Aged Out   Patient is a 69-year-old [employer] male who presents for his annual physical exam.  Patient's dates that he was seen in the ER on 9/22/2022 for syncopal episode and work-up done in the ER was negative.   Patient followed up with his cardiologist who recommended referral to neurology for syncope and also referral to pulmonology for possible BEHZAD. Patient states that he has daytime drowsiness and has been told by his wife that he snores loudly and sometimes stops breathing when he sleeps. He denies any fever, chills, chest pain, abdominal pain, shortness of breath. He states that he has been having some anxiety and depression but denies any suicidal ideation. He states he would like to try medication for his anxiety and depression. States he is taking and tolerating his routine medication. He has a good appetite and remains active. Review of Systems   Constitutional:  Negative for chills and fever. HENT:  Negative for congestion. Respiratory:  Negative for chest tightness and shortness of breath. Cardiovascular:  Negative for chest pain. Gastrointestinal:  Negative for abdominal pain and blood in stool. Genitourinary:  Negative for dysuria and hematuria. Skin:  Negative for rash. Neurological:  Negative for dizziness. Psychiatric/Behavioral:  Positive for dysphoric mood. Negative for suicidal ideas. The patient is nervous/anxious. Objective:   Physical Exam  Vitals and nursing note reviewed. Constitutional:       General: He is not in acute distress. Appearance: He is well-developed. HENT:      Head: Normocephalic and atraumatic. Right Ear: Tympanic membrane, ear canal and external ear normal.      Left Ear: Tympanic membrane, ear canal and external ear normal.      Nose: Nose normal.      Mouth/Throat:      Mouth: Mucous membranes are moist.      Pharynx: Oropharynx is clear. Eyes:      General: No scleral icterus. Right eye: No discharge. Left eye: No discharge. Extraocular Movements: Extraocular movements intact. Conjunctiva/sclera: Conjunctivae normal.      Pupils: Pupils are equal, round, and reactive to light. Cardiovascular:      Rate and Rhythm: Normal rate and regular rhythm. Heart sounds: Normal heart sounds.    Pulmonary:      Effort: Pulmonary effort is normal. No respiratory distress. Breath sounds: Normal breath sounds. No wheezing. Abdominal:      General: There is no distension. Palpations: Abdomen is soft. Tenderness: There is no abdominal tenderness. Hernia: There is no hernia in the left inguinal area or right inguinal area. Genitourinary:     Penis: Normal.       Testes: Normal.   Musculoskeletal:      Cervical back: Neck supple. Right lower leg: No edema. Left lower leg: No edema. Skin:     General: Skin is warm and dry. Findings: No rash. Neurological:      General: No focal deficit present. Mental Status: He is alert and oriented to person, place, and time. Cranial Nerves: No cranial nerve deficit. Deep Tendon Reflexes: Reflexes normal.   Psychiatric:         Mood and Affect: Mood is anxious. Speech: Speech normal.         Behavior: Behavior normal. Behavior is cooperative. Thought Content: Thought content is not paranoid or delusional. Thought content does not include homicidal or suicidal ideation. Assessment:       Diagnosis Orders   1. Annual physical exam        2. Syncope, unspecified syncope type        3. Anxiety and depression  sertraline (ZOLOFT) 25 MG tablet      4. Has daytime drowsiness        5. Loud snoring        6. Needs flu shot  Influenza, FLUCELVAX, (age 10 mo+), IM, Preservative Free, 0.5 mL              Plan:      Orders Placed This Encounter   Procedures    Influenza, FLUCELVAX, (age 10 mo+), IM, Preservative Free, 0.5 mL      Orders Placed This Encounter   Medications    sertraline (ZOLOFT) 25 MG tablet     Sig: Take 1 tablet by mouth daily     Dispense:  30 tablet     Refill:  5   Patient started on Zoloft for anxiety and depression  Patient will be referred to neurology for his syncope and to pulmonology for possible BEHZAD. Patient will check with his insurance company to see what neurologists and pulmonologists are on their panel.   Follow-up in 3 months

## 2022-11-02 DIAGNOSIS — F32.A ANXIETY AND DEPRESSION: ICD-10-CM

## 2022-11-02 DIAGNOSIS — F41.9 ANXIETY AND DEPRESSION: ICD-10-CM

## 2022-11-02 RX ORDER — SERTRALINE HYDROCHLORIDE 25 MG/1
TABLET, FILM COATED ORAL
Qty: 30 TABLET | Refills: 5 | Status: SHIPPED | OUTPATIENT
Start: 2022-11-02